# Patient Record
Sex: FEMALE | Race: WHITE | NOT HISPANIC OR LATINO | Employment: PART TIME | URBAN - METROPOLITAN AREA
[De-identification: names, ages, dates, MRNs, and addresses within clinical notes are randomized per-mention and may not be internally consistent; named-entity substitution may affect disease eponyms.]

---

## 2018-02-12 ENCOUNTER — HOSPITAL ENCOUNTER (EMERGENCY)
Facility: HOSPITAL | Age: 23
Discharge: HOME | End: 2018-02-12
Attending: EMERGENCY MEDICINE | Admitting: EMERGENCY MEDICINE
Payer: COMMERCIAL

## 2018-02-12 VITALS
HEART RATE: 91 BPM | OXYGEN SATURATION: 99 % | HEIGHT: 70 IN | SYSTOLIC BLOOD PRESSURE: 132 MMHG | TEMPERATURE: 98.1 F | RESPIRATION RATE: 18 BRPM | DIASTOLIC BLOOD PRESSURE: 64 MMHG | WEIGHT: 230 LBS | BODY MASS INDEX: 32.93 KG/M2

## 2018-02-12 DIAGNOSIS — L05.91 PILONIDAL CYST: Primary | ICD-10-CM

## 2018-02-12 PROCEDURE — 99282 EMERGENCY DEPT VISIT SF MDM: CPT

## 2018-02-12 RX ORDER — CLINDAMYCIN HYDROCHLORIDE 150 MG/1
300 CAPSULE ORAL ONCE
Status: COMPLETED | OUTPATIENT
Start: 2018-02-12 | End: 2018-02-12

## 2018-02-12 RX ORDER — OXYCODONE HYDROCHLORIDE AND ACETAMINOPHEN 5; 325 MG/1; MG/1
1 TABLET ORAL EVERY 4 HOURS PRN
Qty: 30 TABLET | Refills: 0 | Status: SHIPPED | OUTPATIENT
Start: 2018-02-12 | End: 2018-02-22

## 2018-02-12 RX ORDER — OXYCODONE HYDROCHLORIDE AND ACETAMINOPHEN 5; 325 MG/1; MG/1
2 TABLET ORAL ONCE
Status: COMPLETED | OUTPATIENT
Start: 2018-02-12 | End: 2018-02-12

## 2018-02-12 RX ORDER — CLINDAMYCIN HYDROCHLORIDE 150 MG/1
300 CAPSULE ORAL 4 TIMES DAILY
Qty: 56 CAPSULE | Refills: 0 | Status: SHIPPED | OUTPATIENT
Start: 2018-02-12 | End: 2018-02-19

## 2018-02-12 RX ADMIN — CLINDAMYCIN HYDROCHLORIDE 300 MG: 150 CAPSULE ORAL at 10:39

## 2018-02-12 RX ADMIN — OXYCODONE HYDROCHLORIDE AND ACETAMINOPHEN 2 TABLET: 5; 325 TABLET ORAL at 10:39

## 2018-02-12 NOTE — ED PROVIDER NOTES
History  Chief Complaint   Patient presents with    Abscess     Pt stated that she has an abscess on her tailbone  Started three weeks ago  unable to walk or sit comfortably  Pt has hx of abscess      Patient presents to the emergency department for evaluation of a painful location in her sacral area  She states that she has a history of pilonidal cysts and this is the 3rd episode which has been brewing for the past 3 weeks  She came today as her pain is increasing  She denies any trauma or specific injury  She denies fever chills  She denies rash or abdominal pain  None       History reviewed  No pertinent past medical history  Past Surgical History:   Procedure Laterality Date    WISDOM TOOTH EXTRACTION         History reviewed  No pertinent family history  I have reviewed and agree with the history as documented  Social History   Substance Use Topics    Smoking status: Current Every Day Smoker     Packs/day: 1 00     Types: Cigarettes    Smokeless tobacco: Never Used    Alcohol use No        Review of Systems   Constitutional: Negative  Negative for activity change, appetite change, chills, diaphoresis, fatigue and fever  HENT: Negative  Eyes: Negative  Respiratory: Negative  Cardiovascular: Negative  Gastrointestinal: Negative  Negative for abdominal pain, blood in stool, constipation, diarrhea, nausea, rectal pain and vomiting  Endocrine: Negative  Genitourinary: Negative  Negative for difficulty urinating, dysuria, flank pain, frequency, vaginal bleeding, vaginal discharge and vaginal pain  Musculoskeletal: Positive for back pain  Skin: Negative  Negative for rash and wound  Allergic/Immunologic: Negative  Neurological: Negative  Negative for dizziness and syncope  Hematological: Negative  Psychiatric/Behavioral: Negative          Physical Exam  ED Triage Vitals [02/12/18 0855]   Temperature Pulse Respirations Blood Pressure SpO2   98 1 °F (36 7 °C) 79 20 129/65 99 %      Temp Source Heart Rate Source Patient Position - Orthostatic VS BP Location FiO2 (%)   Oral Monitor Lying Right arm --      Pain Score       9           Orthostatic Vital Signs  Vitals:    02/12/18 0855   BP: 129/65   Pulse: 79   Patient Position - Orthostatic VS: Lying       Physical Exam   Constitutional: She is oriented to person, place, and time  Nontoxic appearance without respiratory distress  Patient appears mildly uncomfortable  Abdominal: Soft  Bowel sounds are normal  She exhibits no distension and no mass  There is no tenderness  There is no rebound and no guarding  No hernia  Musculoskeletal: She exhibits tenderness  She exhibits no edema or deformity  Tender area in the sacrum with mild induration and minimal fluctuance  No open lesions or drainage  No erythema or ecchymosis  Neurological: She is alert and oriented to person, place, and time  She displays normal reflexes  No cranial nerve deficit or sensory deficit  She exhibits normal muscle tone  Coordination normal    Skin: Skin is warm and dry  Psychiatric: She has a normal mood and affect  Her behavior is normal  Judgment and thought content normal    Nursing note and vitals reviewed  ED Medications  Medications   oxyCODONE-acetaminophen (PERCOCET) 5-325 mg per tablet 2 tablet (not administered)   clindamycin (CLEOCIN) capsule 300 mg (not administered)       Diagnostic Studies  Results Reviewed     None                 No orders to display              Procedures  Procedures       Phone Contacts  ED Phone Contact    ED Course  ED Course as of Feb 12 1031   Mon Feb 12, 2018   1028 Patient is stable for discharge  Her presentation does not call for incision and drainage at this point  I will prescribe pain medicine and antibiotics refer the patient to the surgeon is been managing this condition                                  MDM  CritCare Time    Disposition  Final diagnoses:   Pilonidal cyst     Time reflects when diagnosis was documented in both MDM as applicable and the Disposition within this note     Time User Action Codes Description Comment    2/12/2018 10:29 AM Jessicaalphonso Glen Add [L05 91] Pilonidal cyst       ED Disposition     ED Disposition Condition Comment    Discharge  Fredis Luisana discharge to home/self care  Condition at discharge: Stable        Follow-up Information     Follow up With Specialties Details Why [facility] surgeon  Schedule an appointment as soon as possible for a visit          Patient's Medications   Discharge Prescriptions    CLINDAMYCIN (CLEOCIN) 150 MG CAPSULE    Take 2 capsules (300 mg total) by mouth 4 (four) times a day for 7 days       Start Date: 2/12/2018 End Date: 2/19/2018       Order Dose: 300 mg       Quantity: 56 capsule    Refills: 0    OXYCODONE-ACETAMINOPHEN (PERCOCET) 5-325 MG PER TABLET    Take 1 tablet by mouth every 4 (four) hours as needed for moderate pain for up to 10 days Max Daily Amount: 6 tablets       Start Date: 2/12/2018 End Date: 2/22/2018       Order Dose: 1 tablet       Quantity: 30 tablet    Refills: 0     No discharge procedures on file      ED Provider  Electronically Signed by           Michelle Woodson MD  02/12/18 1843

## 2018-02-12 NOTE — DISCHARGE INSTRUCTIONS
Pilonidal Cyst   WHAT YOU NEED TO KNOW:   What is a pilonidal cyst?  A pilonidal cyst is a small sac under the skin  Pilonidal cysts may become infected and cause an abscess (collection of pus)  What causes a pilonidal cyst?  Pilonidal cysts may be caused by an ingrown hair  A hair may become ingrown if it rubs against your skin  The friction can cause hair to dig into the skin and get trapped there  What are the signs and symptoms of a pilonidal cyst?  A pilonidal cyst may look like a small hole or dimple in the center of your lower back  It is usually located right above your buttocks  The pilonidal cyst may feel tender or painful after doing physical activities or sitting for a long period of time  The cyst may feel hot, and be red and swollen if it becomes infected  Pus may also drain from the cyst    How is a pilonidal cyst diagnosed and treated? Your healthcare provider will examine you and ask about your symptoms  You may not need any treatment  A pilonidal cyst may go away on its own  If the cyst becomes infected, the pus may need to be drained  Your healthcare provider may make a small incision to drain the pus and pack it with gauze  Your cyst may need to be surgically removed if it becomes infected often  How can I help prevent an infection? · Shave around the cyst   This will prevent hairs from entering the cyst  Your healthcare provider may recommend laser hair removal      · Clean the cyst area as directed  Your healthcare provider may recommend that you use a mild soap and rinse it well  · Do not sit for long periods of time  This may cause the cyst to get irritated  When should I contact my healthcare provider? · You have a fever  · Your cyst is red and swollen  · Your cyst has pus draining from it  · You have questions or concerns about your condition or care  CARE AGREEMENT:   You have the right to help plan your care   Learn about your health condition and how it may be treated  Discuss treatment options with your caregivers to decide what care you want to receive  You always have the right to refuse treatment  The above information is an  only  It is not intended as medical advice for individual conditions or treatments  Talk to your doctor, nurse or pharmacist before following any medical regimen to see if it is safe and effective for you  © 2017 [address] Information is for End User's use only and may not be sold, redistributed or otherwise used for commercial purposes  All illustrations and images included in CareNotes® are the copyrighted property of [employer]  or [place]

## 2018-02-15 ENCOUNTER — HOSPITAL ENCOUNTER (EMERGENCY)
Facility: HOSPITAL | Age: 23
Discharge: HOME | End: 2018-02-15
Attending: EMERGENCY MEDICINE | Admitting: EMERGENCY MEDICINE
Payer: COMMERCIAL

## 2018-02-15 VITALS
WEIGHT: 230 LBS | BODY MASS INDEX: 33 KG/M2 | SYSTOLIC BLOOD PRESSURE: 121 MMHG | OXYGEN SATURATION: 98 % | HEART RATE: 80 BPM | RESPIRATION RATE: 16 BRPM | DIASTOLIC BLOOD PRESSURE: 59 MMHG | TEMPERATURE: 98.6 F

## 2018-02-15 DIAGNOSIS — L05.91 PILONIDAL CYST WITHOUT ABSCESS: Primary | ICD-10-CM

## 2018-02-15 PROCEDURE — 99282 EMERGENCY DEPT VISIT SF MDM: CPT

## 2018-02-15 RX ORDER — HYDROCODONE BITARTRATE AND ACETAMINOPHEN 5; 325 MG/1; MG/1
1 TABLET ORAL ONCE
Status: COMPLETED | OUTPATIENT
Start: 2018-02-15 | End: 2018-02-15

## 2018-02-15 RX ORDER — IBUPROFEN 600 MG/1
600 TABLET ORAL ONCE
Status: COMPLETED | OUTPATIENT
Start: 2018-02-15 | End: 2018-02-15

## 2018-02-15 RX ORDER — AMOXICILLIN AND CLAVULANATE POTASSIUM 875; 125 MG/1; MG/1
1 TABLET, FILM COATED ORAL EVERY 12 HOURS
Qty: 20 TABLET | Refills: 0 | Status: SHIPPED | OUTPATIENT
Start: 2018-02-15 | End: 2018-02-25

## 2018-02-15 RX ORDER — AMOXICILLIN AND CLAVULANATE POTASSIUM 875; 125 MG/1; MG/1
1 TABLET, FILM COATED ORAL ONCE
Status: COMPLETED | OUTPATIENT
Start: 2018-02-15 | End: 2018-02-15

## 2018-02-15 RX ORDER — ONDANSETRON 4 MG/1
4 TABLET, ORALLY DISINTEGRATING ORAL EVERY 6 HOURS PRN
Qty: 20 TABLET | Refills: 0 | Status: SHIPPED | OUTPATIENT
Start: 2018-02-15 | End: 2019-05-14

## 2018-02-15 RX ORDER — HYDROCODONE BITARTRATE AND ACETAMINOPHEN 5; 325 MG/1; MG/1
1 TABLET ORAL EVERY 6 HOURS PRN
Qty: 6 TABLET | Refills: 0 | Status: SHIPPED | OUTPATIENT
Start: 2018-02-15 | End: 2019-05-14

## 2018-02-15 RX ADMIN — IBUPROFEN 600 MG: 600 TABLET ORAL at 20:45

## 2018-02-15 RX ADMIN — AMOXICILLIN AND CLAVULANATE POTASSIUM 1 TABLET: 875; 125 TABLET, FILM COATED ORAL at 20:46

## 2018-02-15 RX ADMIN — HYDROCODONE BITARTRATE AND ACETAMINOPHEN 1 TABLET: 5; 325 TABLET ORAL at 20:45

## 2018-02-16 NOTE — ED NOTES
Discharge instructions and medications reviewed with pt  Pt verbalized understanding, with no further questions at this time  Pt ambulatory out of department, using steady gait, with no assistance       Lorena Leal RN  02/15/18 2050

## 2018-02-16 NOTE — DISCHARGE INSTRUCTIONS
Take the antibiotics as prescribed  Take anti-inflammatory medications, such as ibuprofen from the Motrin, Advil) or naproxen (Naprosyn, Aleve) as needed for pain and swelling, as per the instructions  Follow up with a surgeon for further evaluation of your recurrent cyst       Pilonidal Cyst   WHAT YOU SHOULD KNOW:   A pilonidal cyst is a small sac under the skin that looks like a small hole or dimple  It usually grows in the skin on your lower back, near the bottom of the spine  AFTER YOU LEAVE:   Follow up with your healthcare provider as directed:  Write down your questions so you remember to ask them during your visits  Contact your primary healthcare provider if:   · You have a fever  · Your cyst is red and swollen  · Your cyst has white or yellow fluid coming out of it  · You have questions or concerns about your condition or care  © 2014 [address] is for End User's use only and may not be sold, redistributed or otherwise used for commercial purposes  All illustrations and images included in CareNotes® are the copyrighted property of [employer]  or [address]  The above information is an  only  It is not intended as medical advice for individual conditions or treatments  Talk to your doctor, nurse or pharmacist before following any medical regimen to see if it is safe and effective for you

## 2018-02-16 NOTE — ED NOTES
Pt crying on stretcher at this time  Pt requesting pain medications  Pt's mother reports pt "ran out of medications given to her last visit " Pt educated upon awaiting provider's evaluation before medications can be prescribed  Pt offered Tylenol or Motrin, but refused       José Miguel Mitchell RN  02/15/18 1931

## 2018-02-16 NOTE — ED PROVIDER NOTES
History  Chief Complaint   Patient presents with    Cyst     Pt has a cyst on her tailbone and was seen on Monday, given abx and pt states it has doubled in size and the pain has not gotten better     HPI    Prior to Admission Medications   Prescriptions Last Dose Informant Patient Reported? Taking? clindamycin (CLEOCIN) 150 mg capsule   No No   Sig: Take 2 capsules (300 mg total) by mouth 4 (four) times a day for 7 days   oxyCODONE-acetaminophen (PERCOCET) 5-325 mg per tablet   No No   Sig: Take 1 tablet by mouth every 4 (four) hours as needed for moderate pain for up to 10 days Max Daily Amount: 6 tablets      Facility-Administered Medications: None       History reviewed  No pertinent past medical history  Past Surgical History:   Procedure Laterality Date    WISDOM TOOTH EXTRACTION         History reviewed  No pertinent family history  I have reviewed and agree with the history as documented  Social History   Substance Use Topics    Smoking status: Current Every Day Smoker     Packs/day: 1 00     Types: Cigarettes    Smokeless tobacco: Never Used    Alcohol use No        Review of Systems    Physical Exam  ED Triage Vitals [02/15/18 1720]   Temperature Pulse Respirations Blood Pressure SpO2   99 3 °F (37 4 °C) (!) 110 20 143/66 99 %      Temp Source Heart Rate Source Patient Position - Orthostatic VS BP Location FiO2 (%)   Oral Monitor Standing Left arm --      Pain Score       Worst Possible Pain           Orthostatic Vital Signs  Vitals:    02/15/18 1720 02/15/18 2047   BP: 143/66 121/59   Pulse: (!) 110 80   Patient Position - Orthostatic VS: Standing Lying       Physical Exam   Constitutional: She is oriented to person, place, and time  She appears well-developed and well-nourished  No distress  HENT:   Head: Normocephalic and atraumatic  Eyes: Conjunctivae are normal  Pupils are equal, round, and reactive to light  Neck: Normal range of motion  No tracheal deviation present  Cardiovascular: Normal rate, regular rhythm and intact distal pulses  Pulses:       Radial pulses are 2+ on the left side  Pulmonary/Chest: Effort normal  No respiratory distress  Abdominal: Soft  She exhibits no distension  There is no tenderness  Neurological: She is alert and oriented to person, place, and time  GCS eye subscore is 4  GCS verbal subscore is 5  GCS motor subscore is 6  Skin: Skin is warm and dry  Psychiatric: She has a normal mood and affect  Her behavior is normal    Nursing note and vitals reviewed  ED Medications  Medications   amoxicillin-clavulanate (AUGMENTIN) 875-125 mg per tablet 1 tablet (1 tablet Oral Given 2/15/18 2046)   ibuprofen (MOTRIN) tablet 600 mg (600 mg Oral Given 2/15/18 2045)   HYDROcodone-acetaminophen (NORCO) 5-325 mg per tablet 1 tablet (1 tablet Oral Given 2/15/18 2045)       Diagnostic Studies  Results Reviewed     None                 RADIOLOGY RESULTS   Final Result by  (02/17 1423)                 Procedures  General Procedure  Date/Time: 2/15/2018 8:20 AM  Performed by: Darryle Sieve  Authorized by: Darryle Sieve     Patient location:  ED and bedside  Assisting Provider(s): No    Consent:     Consent obtained:  Verbal    Consent given by:  Patient  Indications:     Indications:  Evaluate for abscess  Procedure Detail:     Equipment used:  Linear ultrasound probe    Procedure note (site, laterality, method, findings):  <0 5 cm area fluid collection, mild surrounding interstitial edema           Phone Contacts  ED Phone Contact    ED Course  ED Course                                MDM  Number of Diagnoses or Management Options  Pilonidal cyst without abscess: new and requires workup  Diagnosis management comments:  This is a 28-year-old female who presents here today with worsening pilonidal cyst   She was seen here on 02/12 for the same, was told there was not enough fluid to drain, and was started on antibiotics  She returned today because of spread of redness, increase in size and worsening of pain  She endorses some nausea and vomiting secondary to the pain medication, and is not sure of if she has there are not any of the antibiotics, but states the antibiotic is upsetting her stomach as well  She has seen a surgeon for this previously, but was told that as it was a one time infection did not get need surgery for it  She has not followed up with her surgeon since she was seen here three days ago like she was advised, nor called to make an appointment  She denies any abdominal pain, pain with bowel movements, fevers,  or other complaints  ROS: Otherwise negative, unless stated as above  She is well-appearing, in no acute distress  She has a small area of induration at the top of the gluteal cleft, with mild surrounding erythema, and significant tenderness  Bedside ultrasound shows a subcentimeter pocket of fluid, that is far too small to be drained  There is minimal interstitial edema  I discussed with the patient findings, the fact that there is still no fluid for drainage, need for follow-up with surgery for further evaluation and surgical management  As she is endorsing side-effects from the antibiotic and feeling like it is not worsening, we will switch her to Augmentin for treatment  I discussed with her continued symptomatic treatment at home, importance of follow-up, and indications for return, and she expresses understanding with this plan         Amount and/or Complexity of Data Reviewed  Independent visualization of images, tracings, or specimens: yes      CritCare Time    Disposition  Final diagnoses:   Pilonidal cyst without abscess     Time reflects when diagnosis was documented in both MDM as applicable and the Disposition within this note     Time User Action Codes Description Comment    2/15/2018  8:32 PM Burt Scott Add [L05 91] Pilonidal cyst without abscess ED Disposition     ED Disposition Condition Comment    Discharge  Alvin Jaramillo discharge to home/self care  Condition at discharge: Good        Follow-up Information     Follow up With Specialties Details Why Contact Info    Maryann Vigil MD General Surgery Schedule an appointment as soon as possible for a visit in 2 days to follow up on your symptoms [address]  Bertin Masonma [address], DO Family Medicine Schedule an appointment as soon as possible for a visit As needed [address]  [address]  [address] [place] [address]  [phone]          Discharge Medication List as of 2/15/2018  8:36 PM      START taking these medications    Details   amoxicillin-clavulanate (AUGMENTIN) 875-125 mg per tablet Take 1 tablet by mouth every 12 (twelve) hours for 10 days, Starting Thu 2/15/2018, Until Sun 2/25/2018, Print      HYDROcodone-acetaminophen (NORCO) 5-325 mg per tablet Take 1 tablet by mouth every 6 (six) hours as needed for pain (severe pain) Max Daily Amount: 4 tablets, Starting Thu 2/15/2018, Print      ondansetron (ZOFRAN-ODT) 4 mg disintegrating tablet Take 1 tablet (4 mg total) by mouth every 6 (six) hours as needed for nausea or vomiting, Starting Thu 2/15/2018, Print         CONTINUE these medications which have NOT CHANGED    Details   clindamycin (CLEOCIN) 150 mg capsule Take 2 capsules (300 mg total) by mouth 4 (four) times a day for 7 days, Starting Mon 2/12/2018, Until Mon 2/19/2018, Print      oxyCODONE-acetaminophen (PERCOCET) 5-325 mg per tablet Take 1 tablet by mouth every 4 (four) hours as needed for moderate pain for up to 10 days Max Daily Amount: 6 tablets, Starting Mon 2/12/2018, Until Thu 2/22/2018, Print           No discharge procedures on file      ED Provider  Electronically Signed by           Reno Dewitt MD  02/18/18 1022

## 2019-05-14 ENCOUNTER — OFFICE VISIT (OUTPATIENT)
Dept: URGENT CARE | Facility: CLINIC | Age: 24
End: 2019-05-14
Payer: COMMERCIAL

## 2019-05-14 VITALS
RESPIRATION RATE: 16 BRPM | HEART RATE: 93 BPM | DIASTOLIC BLOOD PRESSURE: 64 MMHG | SYSTOLIC BLOOD PRESSURE: 100 MMHG | HEIGHT: 67 IN | WEIGHT: 202 LBS | OXYGEN SATURATION: 97 % | BODY MASS INDEX: 31.71 KG/M2 | TEMPERATURE: 97.3 F

## 2019-05-14 DIAGNOSIS — T63.621A JELLYFISH STING, ACCIDENTAL OR UNINTENTIONAL, INITIAL ENCOUNTER: Primary | ICD-10-CM

## 2019-05-14 PROCEDURE — G0382 LEV 3 HOSP TYPE B ED VISIT: HCPCS | Performed by: PHYSICIAN ASSISTANT

## 2019-05-14 PROCEDURE — S9083 URGENT CARE CENTER GLOBAL: HCPCS | Performed by: PHYSICIAN ASSISTANT

## 2019-05-14 RX ORDER — PREDNISONE 10 MG/1
TABLET ORAL
Qty: 30 TABLET | Refills: 0 | Status: SHIPPED | OUTPATIENT
Start: 2019-05-14 | End: 2019-07-31

## 2019-07-31 ENCOUNTER — OFFICE VISIT (OUTPATIENT)
Dept: URGENT CARE | Facility: CLINIC | Age: 24
End: 2019-07-31
Payer: COMMERCIAL

## 2019-07-31 VITALS
HEIGHT: 70 IN | BODY MASS INDEX: 31.21 KG/M2 | DIASTOLIC BLOOD PRESSURE: 64 MMHG | SYSTOLIC BLOOD PRESSURE: 100 MMHG | TEMPERATURE: 97.6 F | OXYGEN SATURATION: 98 % | RESPIRATION RATE: 16 BRPM | HEART RATE: 88 BPM | WEIGHT: 218 LBS

## 2019-07-31 DIAGNOSIS — L05.91 PILONIDAL CYST: Primary | ICD-10-CM

## 2019-07-31 PROCEDURE — 99213 OFFICE O/P EST LOW 20 MIN: CPT | Performed by: PHYSICIAN ASSISTANT

## 2019-07-31 RX ORDER — CLINDAMYCIN HYDROCHLORIDE 300 MG/1
300 CAPSULE ORAL 4 TIMES DAILY
Qty: 28 CAPSULE | Refills: 0 | Status: SHIPPED | OUTPATIENT
Start: 2019-07-31 | End: 2019-08-07

## 2019-07-31 NOTE — PROGRESS NOTES
[facility]        NAME: Marta Segura is a 26 y o  female  : 1995    MRN: [id number]  DATE: 2019  TIME: 8:50 AM    Assessment and Plan   Pilonidal cyst [L05 91]  1  Pilonidal cyst  clindamycin (CLEOCIN) 300 MG capsule     F/u general surgery    Patient Instructions       Follow up with PCP in 3-5 days  Proceed to  ER if symptoms worsen  Chief Complaint     Chief Complaint   Patient presents with    cyst on tail bone     x 4 years, red/irritated x 2-3 days         History of Present Illness       21-year-old female presents for evaluation of an abscess  Patient states that she has had this in the past   The abscess cyst in the tailbone region  She states she was diagnosed with a pilonidal cyst   She states she has seen General surgery for this and is exploring her options regarding surgery  Patient is afebrile  Review of Systems   Review of Systems   Constitutional: Negative for chills, fatigue and fever  HENT: Negative for congestion, ear pain, sinus pain, sore throat and trouble swallowing  Eyes: Negative for pain, discharge and redness  Respiratory: Negative for cough, chest tightness, shortness of breath and wheezing  Cardiovascular: Negative for chest pain, palpitations and leg swelling  Gastrointestinal: Negative for abdominal pain, diarrhea, nausea and vomiting  Musculoskeletal: Negative for arthralgias, joint swelling and myalgias  Skin: Positive for wound  Negative for rash  Neurological: Negative for dizziness, weakness, numbness and headaches           Current Medications       Current Outpatient Medications:     etonogestrel (NEXPLANON) subdermal implant, 1 ea, Disp: , Rfl:     clindamycin (CLEOCIN) 300 MG capsule, Take 1 capsule (300 mg total) by mouth 4 (four) times a day for 7 days, Disp: 28 capsule, Rfl: 0    Current Allergies     Allergies as of 2019 - Reviewed 2019   Allergen Reaction Noted    Iodine Anaphylaxis 2018    Shellfish-derived products Swelling 01/03/2014    Strawberry extract Rash 12/28/2013            The following portions of the patient's history were reviewed and updated as appropriate: allergies, current medications, past family history, past medical history, past social history, past surgical history and problem list      History reviewed  No pertinent past medical history  Past Surgical History:   Procedure Laterality Date    WISDOM TOOTH EXTRACTION         Family History   Problem Relation Age of Onset    No Known Problems Mother     No Known Problems Father          Medications have been verified  Objective   /64 (BP Location: Left arm, Patient Position: Sitting, Cuff Size: Standard)   Pulse 88   Temp 97 6 °F (36 4 °C) (Tympanic)   Resp 16   Ht 5' 10" (1 778 m)   Wt 98 9 kg (218 lb)   SpO2 98%   BMI 31 28 kg/m²        Physical Exam     Physical Exam   Constitutional: Vital signs are normal  She appears well-developed  No distress  Cardiovascular: Normal rate, regular rhythm, normal heart sounds and intact distal pulses     Pulmonary/Chest: Effort normal and breath sounds normal    Skin:

## 2019-07-31 NOTE — LETTER
July 31, 2019     Patient: Alejo Siddiqui   YOB: 1995   Date of Visit: 7/31/2019       To Whom it May Concern:    Alejo Siddiqui was seen in my clinic on 7/31/2019  She may return to work on 08/01/2019  If you have any questions or concerns, please don't hesitate to call           Sincerely,          Zee Ryan PA-C        CC: No Recipients

## 2019-11-21 ENCOUNTER — OFFICE VISIT (OUTPATIENT)
Dept: URGENT CARE | Facility: CLINIC | Age: 24
End: 2019-11-21
Payer: COMMERCIAL

## 2019-11-21 VITALS
BODY MASS INDEX: 31.21 KG/M2 | TEMPERATURE: 98.5 F | HEART RATE: 87 BPM | WEIGHT: 218 LBS | SYSTOLIC BLOOD PRESSURE: 110 MMHG | DIASTOLIC BLOOD PRESSURE: 70 MMHG | OXYGEN SATURATION: 96 % | RESPIRATION RATE: 18 BRPM | HEIGHT: 70 IN

## 2019-11-21 DIAGNOSIS — J20.9 ACUTE BRONCHITIS, UNSPECIFIED ORGANISM: Primary | ICD-10-CM

## 2019-11-21 PROCEDURE — 99214 OFFICE O/P EST MOD 30 MIN: CPT | Performed by: PHYSICIAN ASSISTANT

## 2019-11-21 RX ORDER — ALBUTEROL SULFATE 90 UG/1
1-2 AEROSOL, METERED RESPIRATORY (INHALATION) EVERY 6 HOURS PRN
Qty: 1 INHALER | Refills: 0 | Status: SHIPPED | OUTPATIENT
Start: 2019-11-21 | End: 2019-11-28

## 2019-11-21 RX ORDER — PREDNISONE 20 MG/1
20 TABLET ORAL 2 TIMES DAILY WITH MEALS
Qty: 10 TABLET | Refills: 0 | Status: SHIPPED | OUTPATIENT
Start: 2019-11-21 | End: 2019-11-26

## 2019-11-21 NOTE — PROGRESS NOTES
[facility]        NAME: Ferdinand Parker is a 27 y o  female  : 1995    MRN: [id number]  DATE: 2019  TIME: 9:58 AM    Assessment and Plan   Acute bronchitis, unspecified organism [J20 9]  1  Acute bronchitis, unspecified organism  predniSONE 20 mg tablet    albuterol (PROVENTIL HFA,VENTOLIN HFA) 90 mcg/act inhaler         Patient Instructions     Patient Instructions   1  Bronchitis  -Take prednisone as directed with food  -Use inhaler or nebulizer every 4-6 hours as needed  -Tylenol/Motrin  -Increase fluids  -Follow-up with PCP within 3-5 days    Go to ER with worsening symptoms, fever, chest pain, shortness of breath, or any signs of distress     Follow up with PCP in 3-5 days  Proceed to  ER if symptoms worsen  Chief Complaint     Chief Complaint   Patient presents with    Cough     Pt c/o productive cough, chest congestion, head congestion and body aches x 3 days  History of Present Illness       Patient is a 44-year-old female who presents today for evaluation of a cough for the past 3 days  Patient states that her chest feels sore from coughing and states that her cough is productive  She also admits having some nasal congestion and body aches  No fevers  Review of Systems   Review of Systems   Constitutional: Negative for chills and fever  HENT: Positive for congestion  Negative for ear pain, rhinorrhea and sore throat  Respiratory: Positive for cough  Negative for shortness of breath  Cardiovascular: Negative for chest pain  Musculoskeletal: Positive for arthralgias  Neurological: Negative for headaches  All other systems reviewed and are negative          Current Medications       Current Outpatient Medications:     albuterol (PROVENTIL HFA,VENTOLIN HFA) 90 mcg/act inhaler, Inhale 1-2 puffs every 6 (six) hours as needed for wheezing for up to 7 days, Disp: 1 Inhaler, Rfl: 0    etonogestrel (NEXPLANON) subdermal implant, 1 ea, Disp: , Rfl:   predniSONE 20 mg tablet, Take 1 tablet (20 mg total) by mouth 2 (two) times a day with meals for 5 days, Disp: 10 tablet, Rfl: 0    Current Allergies     Allergies as of 11/21/2019 - Reviewed 11/21/2019   Allergen Reaction Noted    Iodine Anaphylaxis 02/12/2018    Shellfish-derived products Swelling 01/03/2014    Strawberry extract Rash 12/28/2013            The following portions of the patient's history were reviewed and updated as appropriate: allergies, current medications, past family history, past medical history, past social history, past surgical history and problem list      History reviewed  No pertinent past medical history  Past Surgical History:   Procedure Laterality Date    WISDOM TOOTH EXTRACTION         Family History   Problem Relation Age of Onset    No Known Problems Mother     No Known Problems Father          Medications have been verified  Objective   /70   Pulse 87   Temp 98 5 °F (36 9 °C)   Resp 18   Ht 5' 10" (1 778 m)   Wt 98 9 kg (218 lb)   SpO2 96%   BMI 31 28 kg/m²        Physical Exam     Physical Exam   Constitutional: She is oriented to person, place, and time  She appears well-developed and well-nourished  No distress  HENT:   Head: Normocephalic and atraumatic  Right Ear: Tympanic membrane, external ear and ear canal normal    Left Ear: Tympanic membrane, external ear and ear canal normal    Nose: Nose normal    Mouth/Throat: Uvula is midline, oropharynx is clear and moist and mucous membranes are normal    Eyes: Pupils are equal, round, and reactive to light  Conjunctivae and EOM are normal    Neck: Normal range of motion  Neck supple  Cardiovascular: Normal rate, regular rhythm and normal heart sounds  Pulmonary/Chest: Effort normal  No respiratory distress  She has wheezes (end expiratory wheezing) in the right middle field, the right lower field, the left middle field and the left lower field     Lymphadenopathy:     She has no cervical adenopathy  Neurological: She is alert and oriented to person, place, and time  Skin: Skin is warm and dry  Psychiatric: She has a normal mood and affect  Nursing note and vitals reviewed

## 2019-11-21 NOTE — PATIENT INSTRUCTIONS
1   Bronchitis  -Take prednisone as directed with food  -Use inhaler or nebulizer every 4-6 hours as needed  -Tylenol/Motrin  -Increase fluids  -Follow-up with PCP within 3-5 days    Go to ER with worsening symptoms, fever, chest pain, shortness of breath, or any signs of distress

## 2021-01-22 ENCOUNTER — OFFICE VISIT (OUTPATIENT)
Dept: URGENT CARE | Facility: CLINIC | Age: 26
End: 2021-01-22
Payer: COMMERCIAL

## 2021-01-22 ENCOUNTER — TELEPHONE (OUTPATIENT)
Dept: URGENT CARE | Facility: CLINIC | Age: 26
End: 2021-01-22

## 2021-01-22 VITALS
TEMPERATURE: 97.7 F | HEIGHT: 70 IN | SYSTOLIC BLOOD PRESSURE: 98 MMHG | WEIGHT: 200 LBS | HEART RATE: 83 BPM | BODY MASS INDEX: 28.63 KG/M2 | OXYGEN SATURATION: 96 % | DIASTOLIC BLOOD PRESSURE: 81 MMHG | RESPIRATION RATE: 18 BRPM

## 2021-01-22 DIAGNOSIS — K04.7 DENTAL ABSCESS: Primary | ICD-10-CM

## 2021-01-22 PROCEDURE — 99213 OFFICE O/P EST LOW 20 MIN: CPT | Performed by: PHYSICIAN ASSISTANT

## 2021-01-22 RX ORDER — AMOXICILLIN AND CLAVULANATE POTASSIUM 875; 125 MG/1; MG/1
1 TABLET, FILM COATED ORAL EVERY 12 HOURS SCHEDULED
Qty: 28 TABLET | Refills: 0 | Status: SHIPPED | OUTPATIENT
Start: 2021-01-22 | End: 2021-02-05

## 2021-01-22 RX ORDER — AMOXICILLIN AND CLAVULANATE POTASSIUM 875; 125 MG/1; MG/1
1 TABLET, FILM COATED ORAL EVERY 12 HOURS SCHEDULED
Qty: 28 TABLET | Refills: 0 | Status: SHIPPED | OUTPATIENT
Start: 2021-01-22 | End: 2021-01-22

## 2021-01-22 NOTE — PATIENT INSTRUCTIONS
Start augmentin twice daily, use for only 1 week if symptoms resolved  Warm salt water rinses  Flossing  Follow up with dentist  Go to ER with worsening symptoms  Dental Abscess   WHAT YOU NEED TO KNOW:   A dental abscess is a collection of pus in or around a tooth  A dental abscess is caused by bacteria  The bacteria usually enter the tooth when the enamel (outer part of the tooth) is damaged by tooth decay  Bacteria may also enter the tooth through a break or chip in the tooth, or a cut in the gum  Food particles that are stuck between the teeth for a long time may also lead to an abscess  DISCHARGE INSTRUCTIONS:   Return to the emergency department if:   · You have severe pain  · You have trouble breathing because of pain or swelling  Contact your healthcare provider if:   · Your symptoms get worse, even after treatment  · Your mouth is bleeding  · You cannot eat or drink because of pain or swelling  · Your abscess returns  · You have an injury that causes a crack in your tooth  · You have questions or concerns about your condition or care  Medicines: You may  need any of the following:  · Antibiotics  help treat a bacterial infection  · NSAIDs , such as ibuprofen, help decrease swelling, pain, and fever  This medicine is available with or without a doctor's order  NSAIDs can cause stomach bleeding or kidney problems in certain people  If you take blood thinner medicine, always ask your healthcare provider if NSAIDs are safe for you  Always read the medicine label and follow directions  · Acetaminophen  decreases pain and fever  It is available without a doctor's order  Ask how much to take and how often to take it  Follow directions  Read the labels of all other medicines you are using to see if they also contain acetaminophen, or ask your doctor or pharmacist  Acetaminophen can cause liver damage if not taken correctly   Do not use more than 4 grams (4,000 milligrams) total of acetaminophen in one day  · Prescription pain medicine  may be given  Ask your healthcare provider how to take this medicine safely  Some prescription pain medicines contain acetaminophen  Do not take other medicines that contain acetaminophen without talking to your healthcare provider  Too much acetaminophen may cause liver damage  Prescription pain medicine may cause constipation  Ask your healthcare provider how to prevent or treat constipation  · Take your medicine as directed  Contact your healthcare provider if you think your medicine is not helping or if you have side effects  Tell him of her if you are allergic to any medicine  Keep a list of the medicines, vitamins, and herbs you take  Include the amounts, and when and why you take them  Bring the list or the pill bottles to follow-up visits  Carry your medicine list with you in case of an emergency  Self-care:   · Rinse your mouth every 2 hours with salt water  This will help keep the area clean  · Gently brush your teeth twice a day with a soft tooth brush  This will help keep the area clean  · Eat soft foods as directed  Soft foods may cause less pain  Examples include applesauce, yogurt, and cooked pasta  Ask your healthcare provider how long to follow this instruction  · Apply a warm compress to your tooth or gum  Use a cotton ball or gauze soaked in warm water  Remove the compress in 10 minutes or when it becomes cool  Repeat 3 times a day  Prevent another abscess:   · Brush your teeth at least 2 times a day with fluoride toothpaste  · Use dental floss to clean between your teeth at least once a day  · Rinse your mouth with water or mouthwash after meals and snacks  · Chew sugarless gum after meals and snacks  · Limit foods that are sticky and high in sugar such as raisons  Also limit drinks high in sugar, such as soda       · See your dentist every 6 months for dental cleanings and oral exams  Follow up with your healthcare provider in 24 hours: Your healthcare provider will need to check your teeth and gums  Write down your questions so you remember to ask them during your visits  © Copyright [address] Information is for End User's use only and may not be sold, redistributed or otherwise used for commercial purposes  All illustrations and images included in CareNotes® are the copyrighted property of [employer]  or [facility] [employer]   The above information is an  only  It is not intended as medical advice for individual conditions or treatments  Talk to your doctor, nurse or pharmacist before following any medical regimen to see if it is safe and effective for you
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
## 2021-01-23 NOTE — TELEPHONE ENCOUNTER
Pt called earlier asking to have prescription from today's visit transferred to [facility] in [facility]  Called to let her know provider had transferred it, however her voicemail wasn't set up

## 2021-05-04 ENCOUNTER — OFFICE VISIT (OUTPATIENT)
Dept: URGENT CARE | Facility: CLINIC | Age: 26
End: 2021-05-04
Payer: COMMERCIAL

## 2021-05-04 VITALS
HEART RATE: 86 BPM | RESPIRATION RATE: 18 BRPM | SYSTOLIC BLOOD PRESSURE: 118 MMHG | DIASTOLIC BLOOD PRESSURE: 71 MMHG | OXYGEN SATURATION: 98 %

## 2021-05-04 DIAGNOSIS — K04.7 DENTAL ABSCESS: Primary | ICD-10-CM

## 2021-05-04 PROCEDURE — 99213 OFFICE O/P EST LOW 20 MIN: CPT | Performed by: PHYSICIAN ASSISTANT

## 2021-05-04 RX ORDER — AMOXICILLIN AND CLAVULANATE POTASSIUM 875; 125 MG/1; MG/1
1 TABLET, FILM COATED ORAL EVERY 12 HOURS SCHEDULED
Qty: 20 TABLET | Refills: 0 | Status: SHIPPED | OUTPATIENT
Start: 2021-05-04 | End: 2021-05-14

## 2021-05-04 NOTE — PATIENT INSTRUCTIONS
Start Augmentin  Mouth rinses after each meal   Tylenol and Motrin for pain  Dentist follow-up  PCP follow-up  Go to emergency room with difficulty breathing or swallowing  Dental Abscess   WHAT YOU NEED TO KNOW:   A dental abscess is a collection of pus in or around a tooth  A dental abscess is caused by bacteria  The bacteria usually enter the tooth when the enamel (outer part of the tooth) is damaged by tooth decay  Bacteria may also enter the tooth through a break or chip in the tooth, or a cut in the gum  Food particles that are stuck between the teeth for a long time may also lead to an abscess  DISCHARGE INSTRUCTIONS:   Return to the emergency department if:   · You have severe pain  · You have trouble breathing because of pain or swelling  Contact your healthcare provider if:   · Your symptoms get worse, even after treatment  · Your mouth is bleeding  · You cannot eat or drink because of pain or swelling  · Your abscess returns  · You have an injury that causes a crack in your tooth  · You have questions or concerns about your condition or care  Medicines: You may  need any of the following:  · Antibiotics  help treat a bacterial infection  · NSAIDs , such as ibuprofen, help decrease swelling, pain, and fever  This medicine is available with or without a doctor's order  NSAIDs can cause stomach bleeding or kidney problems in certain people  If you take blood thinner medicine, always ask your healthcare provider if NSAIDs are safe for you  Always read the medicine label and follow directions  · Acetaminophen  decreases pain and fever  It is available without a doctor's order  Ask how much to take and how often to take it  Follow directions  Read the labels of all other medicines you are using to see if they also contain acetaminophen, or ask your doctor or pharmacist  Acetaminophen can cause liver damage if not taken correctly   Do not use more than 4 grams (4,000 milligrams) total of acetaminophen in one day  · Prescription pain medicine  may be given  Ask your healthcare provider how to take this medicine safely  Some prescription pain medicines contain acetaminophen  Do not take other medicines that contain acetaminophen without talking to your healthcare provider  Too much acetaminophen may cause liver damage  Prescription pain medicine may cause constipation  Ask your healthcare provider how to prevent or treat constipation  · Take your medicine as directed  Contact your healthcare provider if you think your medicine is not helping or if you have side effects  Tell him of her if you are allergic to any medicine  Keep a list of the medicines, vitamins, and herbs you take  Include the amounts, and when and why you take them  Bring the list or the pill bottles to follow-up visits  Carry your medicine list with you in case of an emergency  Self-care:   · Rinse your mouth every 2 hours with salt water  This will help keep the area clean  · Gently brush your teeth twice a day with a soft tooth brush  This will help keep the area clean  · Eat soft foods as directed  Soft foods may cause less pain  Examples include applesauce, yogurt, and cooked pasta  Ask your healthcare provider how long to follow this instruction  · Apply a warm compress to your tooth or gum  Use a cotton ball or gauze soaked in warm water  Remove the compress in 10 minutes or when it becomes cool  Repeat 3 times a day  Prevent another abscess:   · Brush your teeth at least 2 times a day with fluoride toothpaste  · Use dental floss to clean between your teeth at least once a day  · Rinse your mouth with water or mouthwash after meals and snacks  · Chew sugarless gum after meals and snacks  · Limit foods that are sticky and high in sugar such as raisons  Also limit drinks high in sugar, such as soda       · See your dentist every 6 months for dental cleanings and oral exams  Follow up with your healthcare provider in 24 hours: Your healthcare provider will need to check your teeth and gums  Write down your questions so you remember to ask them during your visits  © Copyright [address] Information is for End User's use only and may not be sold, redistributed or otherwise used for commercial purposes  All illustrations and images included in CareNotes® are the copyrighted property of [employer]  or [facility] [employer]   The above information is an  only  It is not intended as medical advice for individual conditions or treatments  Talk to your doctor, nurse or pharmacist before following any medical regimen to see if it is safe and effective for you
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
## 2021-07-07 ENCOUNTER — OFFICE VISIT (OUTPATIENT)
Dept: URGENT CARE | Facility: CLINIC | Age: 26
End: 2021-07-07
Payer: COMMERCIAL

## 2021-07-07 VITALS
RESPIRATION RATE: 16 BRPM | HEART RATE: 91 BPM | WEIGHT: 183 LBS | DIASTOLIC BLOOD PRESSURE: 72 MMHG | TEMPERATURE: 97.3 F | SYSTOLIC BLOOD PRESSURE: 104 MMHG | BODY MASS INDEX: 26.26 KG/M2 | OXYGEN SATURATION: 96 %

## 2021-07-07 DIAGNOSIS — L05.91 PILONIDAL CYST: Primary | ICD-10-CM

## 2021-07-07 PROCEDURE — G0382 LEV 3 HOSP TYPE B ED VISIT: HCPCS | Performed by: PHYSICIAN ASSISTANT

## 2021-07-07 PROCEDURE — S9083 URGENT CARE CENTER GLOBAL: HCPCS | Performed by: PHYSICIAN ASSISTANT

## 2021-07-07 RX ORDER — AMOXICILLIN AND CLAVULANATE POTASSIUM 875; 125 MG/1; MG/1
1 TABLET, FILM COATED ORAL EVERY 12 HOURS SCHEDULED
Qty: 14 TABLET | Refills: 0 | Status: SHIPPED | OUTPATIENT
Start: 2021-07-07 | End: 2021-07-14
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
## 2023-09-26 ENCOUNTER — OFFICE VISIT (OUTPATIENT)
Dept: URGENT CARE | Facility: CLINIC | Age: 28
End: 2023-09-26

## 2023-09-26 VITALS
OXYGEN SATURATION: 98 % | WEIGHT: 197.25 LBS | HEART RATE: 68 BPM | DIASTOLIC BLOOD PRESSURE: 64 MMHG | TEMPERATURE: 97.9 F | BODY MASS INDEX: 28.3 KG/M2 | SYSTOLIC BLOOD PRESSURE: 118 MMHG | RESPIRATION RATE: 16 BRPM

## 2023-09-26 DIAGNOSIS — Z78.9 DATE OF LAST MENSTRUAL PERIOD (LMP) UNKNOWN: ICD-10-CM

## 2023-09-26 DIAGNOSIS — K08.89 PAIN, DENTAL: Primary | ICD-10-CM

## 2023-09-26 LAB — SL AMB POCT URINE HCG: NEGATIVE

## 2023-09-26 PROCEDURE — 99213 OFFICE O/P EST LOW 20 MIN: CPT

## 2023-09-26 PROCEDURE — 96372 THER/PROPH/DIAG INJ SC/IM: CPT

## 2023-09-26 PROCEDURE — 81025 URINE PREGNANCY TEST: CPT

## 2023-09-26 RX ORDER — PENICILLIN V POTASSIUM 500 MG/1
500 TABLET ORAL EVERY 6 HOURS SCHEDULED
Qty: 56 TABLET | Refills: 0 | Status: SHIPPED | OUTPATIENT
Start: 2023-09-26 | End: 2023-10-10

## 2023-09-26 RX ORDER — IBUPROFEN 800 MG/1
800 TABLET ORAL EVERY 6 HOURS PRN
Qty: 30 TABLET | Refills: 0 | Status: SHIPPED | OUTPATIENT
Start: 2023-09-26

## 2023-09-26 RX ORDER — KETOROLAC TROMETHAMINE 30 MG/ML
30 INJECTION, SOLUTION INTRAMUSCULAR; INTRAVENOUS ONCE
Status: COMPLETED | OUTPATIENT
Start: 2023-09-26 | End: 2023-09-26

## 2023-09-26 RX ADMIN — KETOROLAC TROMETHAMINE 30 MG: 30 INJECTION, SOLUTION INTRAMUSCULAR; INTRAVENOUS at 08:50

## 2023-09-26 NOTE — PATIENT INSTRUCTIONS
Take antibiotic as directed. Take entire course of antibiotics. Eat yogurt with live and active cultures and/or take a probiotic at least 3 hours before or after antibiotic dose. Monitor stool for diarrhea and/or blood. If this occurs, contact primary care doctor ASAP. Recommend saltwater rinses every 2 hours, soft toothbrush, and soft food. Tylenol 1000 mg q8h as needed for pain  Ibuprofen 800 mg every 6 hours as needed for pain     If you develop any increased pain, facial swelling, or unable to open or close your mouth, prolonged high fever, any new or concerning symptoms please return proceed ER. Advised follow-up with dentist as soon as possible. Follow up with PCP in 3-5 days. Proceed to ER if symptoms worsen.
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
## 2024-09-11 ENCOUNTER — APPOINTMENT (OUTPATIENT)
Dept: URGENT CARE | Facility: MEDICAL CENTER | Age: 29
End: 2024-09-11

## 2024-09-12 ENCOUNTER — TELEPHONE (OUTPATIENT)
Dept: FAMILY MEDICINE CLINIC | Facility: CLINIC | Age: 29
End: 2024-09-12

## 2024-09-12 NOTE — TELEPHONE ENCOUNTER
Patient was scheduled today for an office visit to complete work form.  The work form is for medical marijuana use, to be approved for her employer.  Unable to complete form.  Discussed need to have form completed by medical marijuana provider or someone who is more adverse with use, side effects.  Patient's visit was canceled today.

## 2024-12-18 ENCOUNTER — HOSPITAL ENCOUNTER (EMERGENCY)
Facility: HOSPITAL | Age: 29
Discharge: HOME | End: 2024-12-18
Attending: EMERGENCY MEDICINE
Payer: COMMERCIAL

## 2024-12-18 VITALS
OXYGEN SATURATION: 98 % | HEART RATE: 78 BPM | RESPIRATION RATE: 18 BRPM | TEMPERATURE: 98.7 F | DIASTOLIC BLOOD PRESSURE: 62 MMHG | SYSTOLIC BLOOD PRESSURE: 114 MMHG

## 2024-12-18 DIAGNOSIS — H04.123 DRY EYES: Primary | ICD-10-CM

## 2024-12-18 PROCEDURE — 99284 EMERGENCY DEPT VISIT MOD MDM: CPT

## 2024-12-18 PROCEDURE — 99282 EMERGENCY DEPT VISIT SF MDM: CPT

## 2024-12-18 RX ORDER — ERYTHROMYCIN 5 MG/G
0.5 OINTMENT OPHTHALMIC ONCE
Status: COMPLETED | OUTPATIENT
Start: 2024-12-18 | End: 2024-12-18

## 2024-12-18 RX ORDER — ERYTHROMYCIN 5 MG/G
OINTMENT OPHTHALMIC
Qty: 3.5 G | Refills: 0 | Status: SHIPPED | OUTPATIENT
Start: 2024-12-18 | End: 2024-12-18 | Stop reason: SDUPTHER

## 2024-12-18 RX ORDER — TETRACAINE HYDROCHLORIDE 5 MG/ML
2 SOLUTION OPHTHALMIC ONCE
Status: COMPLETED | OUTPATIENT
Start: 2024-12-18 | End: 2024-12-18

## 2024-12-18 RX ADMIN — FLUORESCEIN SODIUM 1 STRIP: 1 STRIP OPHTHALMIC at 04:31

## 2024-12-18 RX ADMIN — TETRACAINE HYDROCHLORIDE 2 DROP: 5 SOLUTION OPHTHALMIC at 04:31

## 2024-12-18 RX ADMIN — ERYTHROMYCIN 0.5 INCH: 5 OINTMENT OPHTHALMIC at 05:08

## 2024-12-18 NOTE — DISCHARGE INSTRUCTIONS
Follow-up with PCP and ophthalmology.  Return to ED if symptoms worsen, fail to improve, or develop as listed in follow-up section.  Stop contact lense use until seen by ophthalmology.  Erythromycin ointment 4-6 times daily for 7 days .
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
## 2025-01-20 ENCOUNTER — OFFICE VISIT (OUTPATIENT)
Dept: OBGYN CLINIC | Facility: CLINIC | Age: 30
End: 2025-01-20
Payer: COMMERCIAL

## 2025-01-20 VITALS
WEIGHT: 188 LBS | BODY MASS INDEX: 26.92 KG/M2 | HEIGHT: 70 IN | DIASTOLIC BLOOD PRESSURE: 68 MMHG | SYSTOLIC BLOOD PRESSURE: 120 MMHG

## 2025-01-20 DIAGNOSIS — Z30.46 ENCOUNTER FOR NEXPLANON REMOVAL: Primary | ICD-10-CM

## 2025-01-20 PROCEDURE — 11982 REMOVE DRUG IMPLANT DEVICE: CPT | Performed by: PHYSICIAN ASSISTANT

## 2025-01-20 NOTE — PROGRESS NOTES
Universal Protocol:  procedure performed by consultantConsent: Verbal consent obtained. Written consent obtained.  Risks and benefits: risks, benefits and alternatives were discussed  Consent given by: patient  Patient understanding: patient states understanding of the procedure being performed  Patient consent: the patient's understanding of the procedure matches consent given  Procedure consent: procedure consent matches procedure scheduled  Required items: required blood products, implants, devices, and special equipment available  Patient identity confirmed: verbally with patient  Remove and insert drug implant    Date/Time: 1/20/2025 11:30 AM    Performed by: Maureen Chávez PA-C  Authorized by: Maureen Chávez PA-C    Indication:     Indication: Presence of non-biodegradable drug delivery implant      Indication comment:  EXPIRY OF DEVICE, NEED FOR REMOVAL  Pre-procedure:     Prepped with: alcohol 70% and chlorhexidine gluconate      Local anesthetic:  Lidocaine with epinephrine    The site was cleaned and prepped in a sterile fashion: yes    Procedure:     Procedure:  Removal    Small stab incision was made in arm: yes      Left/right:  Left    Visualization of implant was obtained: yes      Site was closed with steri-strips and pressure bandage applied: yes    Comments:      Patient presents to the office for Nexplanon removal due to EXPIRY OF DEVICE. The implant was palpated by patient and provider in her LEFT arm. The area was cleaned with alcohol and numbed with local anesthetic. The area was then prepped in sterile fashion using HIBICLENS A small stab incision was made near the distal end of the implant, and the implant was visualized. The implant was grasped with hemostats and removed intact through the incision. The procedure was uncomplicated and the patient tolerated the procedure well. The incision was closed with steri-strips and a pressure dressing was applied. The patient was counseled to leave  pressure dressing in place for 24 hours and to keep incision covered for the next few days with a band-aid. All questions were answered to patient satisfaction.

## 2025-02-04 ENCOUNTER — OFFICE VISIT (OUTPATIENT)
Dept: URGENT CARE | Facility: CLINIC | Age: 30
End: 2025-02-04
Payer: COMMERCIAL

## 2025-02-04 VITALS
SYSTOLIC BLOOD PRESSURE: 116 MMHG | OXYGEN SATURATION: 99 % | RESPIRATION RATE: 18 BRPM | TEMPERATURE: 98.9 F | HEART RATE: 66 BPM | DIASTOLIC BLOOD PRESSURE: 56 MMHG

## 2025-02-04 DIAGNOSIS — K08.89 PAIN, DENTAL: Primary | ICD-10-CM

## 2025-02-04 PROCEDURE — S9083 URGENT CARE CENTER GLOBAL: HCPCS

## 2025-02-04 PROCEDURE — G0382 LEV 3 HOSP TYPE B ED VISIT: HCPCS

## 2025-02-04 RX ORDER — CHLORHEXIDINE GLUCONATE ORAL RINSE 1.2 MG/ML
15 SOLUTION DENTAL 2 TIMES DAILY
Qty: 120 ML | Refills: 0 | Status: SHIPPED | OUTPATIENT
Start: 2025-02-04
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
## 2025-02-04 NOTE — PATIENT INSTRUCTIONS
Take Augmentin as prescribed. Finish the entire course of medication even if you start feeling better.     You can take ibuprofen/Motrin or acetaminophen/Tylenol as needed for pain.    You should drink liquids/eat food that is room temperature to avoid further irritation.     Recommend follow up in 3-5 days with a dentist.    Go to the ED for fevers, chills, new and worsening symptoms.

## 2025-02-24 ENCOUNTER — APPOINTMENT (OUTPATIENT)
Dept: RADIOLOGY | Facility: CLINIC | Age: 30
End: 2025-02-24
Payer: COMMERCIAL

## 2025-02-24 ENCOUNTER — OFFICE VISIT (OUTPATIENT)
Dept: URGENT CARE | Facility: CLINIC | Age: 30
End: 2025-02-24
Payer: COMMERCIAL

## 2025-02-24 VITALS
HEART RATE: 108 BPM | SYSTOLIC BLOOD PRESSURE: 118 MMHG | DIASTOLIC BLOOD PRESSURE: 59 MMHG | TEMPERATURE: 98.1 F | RESPIRATION RATE: 18 BRPM | OXYGEN SATURATION: 97 %

## 2025-02-24 DIAGNOSIS — M25.511 ACUTE PAIN OF RIGHT SHOULDER: Primary | ICD-10-CM

## 2025-02-24 DIAGNOSIS — M25.511 ACUTE PAIN OF RIGHT SHOULDER: ICD-10-CM

## 2025-02-24 PROCEDURE — 73030 X-RAY EXAM OF SHOULDER: CPT

## 2025-02-24 PROCEDURE — G0382 LEV 3 HOSP TYPE B ED VISIT: HCPCS

## 2025-02-24 PROCEDURE — S9083 URGENT CARE CENTER GLOBAL: HCPCS
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
## 2025-02-24 NOTE — PATIENT INSTRUCTIONS
Rest and elevate shoulder/arm often, apply ice every 3-4 hours for 20 minutes at a time for next 24 hours. Take tylenol/ibuprofen every 4-6 hours as needed for pain. Apply lidocaine patches - 12 hours on, 12 hours off as needed for breakthrough pain. Follow-up with orthopedics within one week. Report to the ER if symptoms worsen.

## 2025-02-25 ENCOUNTER — OFFICE VISIT (OUTPATIENT)
Dept: URGENT CARE | Facility: MEDICAL CENTER | Age: 30
End: 2025-02-25
Payer: COMMERCIAL

## 2025-02-25 VITALS
TEMPERATURE: 98 F | DIASTOLIC BLOOD PRESSURE: 60 MMHG | BODY MASS INDEX: 26.9 KG/M2 | SYSTOLIC BLOOD PRESSURE: 104 MMHG | OXYGEN SATURATION: 99 % | RESPIRATION RATE: 18 BRPM | HEART RATE: 74 BPM | WEIGHT: 187.5 LBS

## 2025-02-25 DIAGNOSIS — R45.89 ANXIETY ABOUT HEALTH: ICD-10-CM

## 2025-02-25 DIAGNOSIS — R59.1 LYMPHADENOPATHY: Primary | ICD-10-CM

## 2025-02-25 LAB — S PYO AG THROAT QL: NEGATIVE

## 2025-02-25 PROCEDURE — G0383 LEV 4 HOSP TYPE B ED VISIT: HCPCS

## 2025-02-25 PROCEDURE — S9083 URGENT CARE CENTER GLOBAL: HCPCS

## 2025-02-25 PROCEDURE — 87880 STREP A ASSAY W/OPTIC: CPT

## 2025-02-25 PROCEDURE — 87070 CULTURE OTHR SPECIMN AEROBIC: CPT

## 2025-02-25 NOTE — LETTER
February 25, 2025     Patient: Rita Packer   YOB: 1995   Date of Visit: 2/25/2025       To Whom it May Concern:    Rita Packer was seen in my clinic on 2/25/2025.     She may return to work on 2/28/2025 or sooner as long as symptoms are improving or resolving and patient is fever free for 24 hours without the use of fever reducing agents .    If you have any questions or concerns, please don't hesitate to call.         Sincerely,          DANIEL Cooper        CC: No Recipients
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
## 2025-02-26 ENCOUNTER — OFFICE VISIT (OUTPATIENT)
Dept: OBGYN CLINIC | Facility: CLINIC | Age: 30
End: 2025-02-26
Payer: COMMERCIAL

## 2025-02-26 ENCOUNTER — TELEPHONE (OUTPATIENT)
Age: 30
End: 2025-02-26

## 2025-02-26 VITALS — HEIGHT: 70 IN | BODY MASS INDEX: 26.77 KG/M2 | WEIGHT: 187 LBS

## 2025-02-26 DIAGNOSIS — M77.8 TENDINITIS OF RIGHT SHOULDER: Primary | ICD-10-CM

## 2025-02-26 DIAGNOSIS — M25.511 ACUTE PAIN OF RIGHT SHOULDER: ICD-10-CM

## 2025-02-26 PROCEDURE — 99244 OFF/OP CNSLTJ NEW/EST MOD 40: CPT | Performed by: ORTHOPAEDIC SURGERY

## 2025-02-26 NOTE — PATIENT INSTRUCTIONS
Patient to follow up with Orthopedics tomorrow morning for R shoulder pain    Differential diagnoses discussed with patient.   Rapid strepA negative  Will send out for culture.   If positive will treat with antibiotics.   If throat culture is negative, please follow up with PCP as discussed for further evaluation and work up including but not limited to blood work.    Referral placed for Psych-Talk Therapy as discussed for anxiety related to health    Follow up with PCP in 3-5 days.  Proceed to  ER if symptoms worsen.    If tests are performed, our office will contact you with results only if changes need to made to the care plan discussed with you at the visit. You can review your full results on [facility].
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
## 2025-02-26 NOTE — TELEPHONE ENCOUNTER
Contacted patient in regards to ASAP Referral  in attempts to verify patient's needs of services and add patient to proper wait list. spoke with patient whom stated she will be getting therapy at her employer and does not need our services at this time. Closing referral.
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
## 2025-02-28 LAB — BACTERIA THROAT CULT: NORMAL

## 2025-03-06 ENCOUNTER — HOSPITAL ENCOUNTER (EMERGENCY)
Facility: HOSPITAL | Age: 30
Discharge: HOME | End: 2025-03-06
Attending: EMERGENCY MEDICINE
Payer: COMMERCIAL

## 2025-03-06 ENCOUNTER — APPOINTMENT (EMERGENCY)
Dept: CT IMAGING | Facility: HOSPITAL | Age: 30
End: 2025-03-06
Payer: COMMERCIAL

## 2025-03-06 VITALS
TEMPERATURE: 98.2 F | RESPIRATION RATE: 17 BRPM | DIASTOLIC BLOOD PRESSURE: 75 MMHG | OXYGEN SATURATION: 100 % | HEART RATE: 84 BPM | SYSTOLIC BLOOD PRESSURE: 113 MMHG

## 2025-03-06 DIAGNOSIS — R59.0 LYMPHADENOPATHY, CERVICAL: Primary | ICD-10-CM

## 2025-03-06 LAB
ALBUMIN SERPL BCG-MCNC: 4.8 G/DL (ref 3.5–5)
ALP SERPL-CCNC: 50 U/L (ref 34–104)
ALT SERPL W P-5'-P-CCNC: 13 U/L (ref 7–52)
ANION GAP SERPL CALCULATED.3IONS-SCNC: 8 MMOL/L (ref 4–13)
AST SERPL W P-5'-P-CCNC: 13 U/L (ref 13–39)
BASOPHILS # BLD AUTO: 0.02 THOUSANDS/ÂΜL (ref 0–0.1)
BASOPHILS NFR BLD AUTO: 0 % (ref 0–1)
BILIRUB SERPL-MCNC: 0.51 MG/DL (ref 0.2–1)
BUN SERPL-MCNC: 11 MG/DL (ref 5–25)
CALCIUM SERPL-MCNC: 9.9 MG/DL (ref 8.4–10.2)
CHLORIDE SERPL-SCNC: 104 MMOL/L (ref 96–108)
CO2 SERPL-SCNC: 28 MMOL/L (ref 21–32)
CREAT SERPL-MCNC: 0.72 MG/DL (ref 0.6–1.3)
EOSINOPHIL # BLD AUTO: 0.04 THOUSAND/ÂΜL (ref 0–0.61)
EOSINOPHIL NFR BLD AUTO: 1 % (ref 0–6)
ERYTHROCYTE [DISTWIDTH] IN BLOOD BY AUTOMATED COUNT: 12.3 % (ref 11.6–15.1)
GFR SERPL CREATININE-BSD FRML MDRD: 115 ML/MIN/1.73SQ M
GLUCOSE SERPL-MCNC: 89 MG/DL (ref 65–140)
HCT VFR BLD AUTO: 37.9 % (ref 34.8–46.1)
HGB BLD-MCNC: 13.3 G/DL (ref 11.5–15.4)
IMM GRANULOCYTES # BLD AUTO: 0.01 THOUSAND/UL (ref 0–0.2)
IMM GRANULOCYTES NFR BLD AUTO: 0 % (ref 0–2)
LYMPHOCYTES # BLD AUTO: 1.63 THOUSANDS/ÂΜL (ref 0.6–4.47)
LYMPHOCYTES NFR BLD AUTO: 32 % (ref 14–44)
MCH RBC QN AUTO: 29.2 PG (ref 26.8–34.3)
MCHC RBC AUTO-ENTMCNC: 35.1 G/DL (ref 31.4–37.4)
MCV RBC AUTO: 83 FL (ref 82–98)
MONOCYTES # BLD AUTO: 0.32 THOUSAND/ÂΜL (ref 0.17–1.22)
MONOCYTES NFR BLD AUTO: 6 % (ref 4–12)
NEUTROPHILS # BLD AUTO: 3.01 THOUSANDS/ÂΜL (ref 1.85–7.62)
NEUTS SEG NFR BLD AUTO: 61 % (ref 43–75)
NRBC BLD AUTO-RTO: 0 /100 WBCS
PLATELET # BLD AUTO: 157 THOUSANDS/UL (ref 149–390)
PMV BLD AUTO: 9.4 FL (ref 8.9–12.7)
POTASSIUM SERPL-SCNC: 4.4 MMOL/L (ref 3.5–5.3)
PROT SERPL-MCNC: 7 G/DL (ref 6.4–8.4)
RBC # BLD AUTO: 4.56 MILLION/UL (ref 3.81–5.12)
SODIUM SERPL-SCNC: 140 MMOL/L (ref 135–147)
TSH SERPL DL<=0.05 MIU/L-ACNC: 1.05 UIU/ML (ref 0.45–4.5)
WBC # BLD AUTO: 5.03 THOUSAND/UL (ref 4.31–10.16)

## 2025-03-06 PROCEDURE — 80053 COMPREHEN METABOLIC PANEL: CPT

## 2025-03-06 PROCEDURE — 99284 EMERGENCY DEPT VISIT MOD MDM: CPT

## 2025-03-06 PROCEDURE — 84443 ASSAY THYROID STIM HORMONE: CPT

## 2025-03-06 PROCEDURE — 70490 CT SOFT TISSUE NECK W/O DYE: CPT

## 2025-03-06 PROCEDURE — 85025 COMPLETE CBC W/AUTO DIFF WBC: CPT

## 2025-03-06 PROCEDURE — 36415 COLL VENOUS BLD VENIPUNCTURE: CPT

## 2025-03-06 PROCEDURE — 99283 EMERGENCY DEPT VISIT LOW MDM: CPT
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
## 2025-03-07 NOTE — DISCHARGE INSTRUCTIONS
Please continue to monitor your symptoms. Encourage you to schedule an appointment with primary care to follow up with your primary care regarding these symptoms. If you develop trouble swallowing or breathing return to the ER.

## 2025-03-11 ENCOUNTER — OFFICE VISIT (OUTPATIENT)
Dept: FAMILY MEDICINE CLINIC | Facility: CLINIC | Age: 30
End: 2025-03-11
Payer: COMMERCIAL

## 2025-03-11 VITALS
BODY MASS INDEX: 27 KG/M2 | WEIGHT: 188.6 LBS | HEIGHT: 70 IN | TEMPERATURE: 98.9 F | OXYGEN SATURATION: 100 % | DIASTOLIC BLOOD PRESSURE: 62 MMHG | SYSTOLIC BLOOD PRESSURE: 110 MMHG | HEART RATE: 99 BPM | RESPIRATION RATE: 20 BRPM

## 2025-03-11 DIAGNOSIS — K11.1 SALIVARY GLAND ENLARGEMENT: Primary | ICD-10-CM

## 2025-03-11 PROCEDURE — 99203 OFFICE O/P NEW LOW 30 MIN: CPT | Performed by: FAMILY MEDICINE

## 2025-03-11 RX ORDER — CEPHALEXIN 500 MG/1
500 CAPSULE ORAL EVERY 8 HOURS SCHEDULED
Qty: 21 CAPSULE | Refills: 0 | Status: SHIPPED | OUTPATIENT
Start: 2025-03-11 | End: 2025-03-18

## 2025-03-11 NOTE — ASSESSMENT & PLAN NOTE
Try bud and salt water gargles    Orders:    cephalexin (KEFLEX) 500 mg capsule; Take 1 capsule (500 mg total) by mouth every 8 (eight) hours for 7 days

## 2025-03-11 NOTE — PROGRESS NOTES
"Name: Rita Packer      : 1995      MRN: [id number]  Encounter Provider: Oseas Mas DO  Encounter Date: 3/11/2025   Encounter department: [facility]  :  Assessment & Plan  Salivary gland enlargement  Try bud and salt water gargles    Orders:    cephalexin (KEFLEX) 500 mg capsule; Take 1 capsule (500 mg total) by mouth every 8 (eight) hours for 7 days           History of Present Illness   Patient presents today for swelling under her neck and chin and review of recent testing done through emergency department.  Also anxiety over the situation      Review of Systems   Constitutional:  Negative for chills, fatigue and fever.   HENT:  Negative for congestion, nosebleeds, rhinorrhea, sinus pressure and sore throat.    Eyes:  Negative for discharge and redness.   Respiratory:  Negative for cough and shortness of breath.    Cardiovascular:  Negative for chest pain, palpitations and leg swelling.   Gastrointestinal:  Negative for abdominal pain, blood in stool and nausea.   Endocrine: Negative for cold intolerance, heat intolerance and polyuria.   Genitourinary:  Negative for dysuria and frequency.   Musculoskeletal:  Negative for arthralgias, back pain and myalgias.   Skin:  Negative for rash.   Neurological:  Negative for dizziness, weakness and headaches.   Hematological:  Negative for adenopathy.   Psychiatric/Behavioral:  Negative for behavioral problems and sleep disturbance. The patient is not nervous/anxious.        Objective   /62 (BP Location: Left arm, Patient Position: Sitting, Cuff Size: Standard)   Pulse 99   Temp 98.9 °F (37.2 °C) (Tympanic)   Resp 20   Ht 5' 10\" (1.778 m)   Wt 85.5 kg (188 lb 9.6 oz)   SpO2 100%   BMI 27.06 kg/m²      Physical Exam  Vitals and nursing note reviewed.   Constitutional:       General: She is not in acute distress.     Appearance: Normal appearance. She is well-developed.   HENT:      Head: Normocephalic and " atraumatic.      Right Ear: Tympanic membrane and external ear normal.      Left Ear: Tympanic membrane and external ear normal.      Nose: Nose normal.      Mouth/Throat:      Mouth: Mucous membranes are moist.      Pharynx: Oropharynx is clear. No oropharyngeal exudate.   Eyes:      General: No scleral icterus.        Right eye: No discharge.         Left eye: No discharge.      Conjunctiva/sclera: Conjunctivae normal.      Pupils: Pupils are equal, round, and reactive to light.   Neck:      Thyroid: No thyromegaly.      Vascular: No JVD.   Cardiovascular:      Rate and Rhythm: Normal rate and regular rhythm.      Heart sounds: Normal heart sounds. No murmur heard.  Pulmonary:      Effort: Pulmonary effort is normal.      Breath sounds: No wheezing or rales.   Chest:      Chest wall: No tenderness.   Abdominal:      General: Bowel sounds are normal. There is no distension.      Palpations: Abdomen is soft. There is no mass.      Tenderness: There is no abdominal tenderness.   Musculoskeletal:         General: No tenderness or deformity. Normal range of motion.      Cervical back: Normal range of motion.   Lymphadenopathy:      Cervical: No cervical adenopathy.   Skin:     General: Skin is warm and dry.      Findings: No rash.   Neurological:      General: No focal deficit present.      Mental Status: She is alert and oriented to person, place, and time.      Cranial Nerves: No cranial nerve deficit.      Coordination: Coordination normal.      Deep Tendon Reflexes: Reflexes are normal and symmetric. Reflexes normal.   Psychiatric:         Mood and Affect: Mood normal.         Behavior: Behavior normal.         Thought Content: Thought content normal.         Judgment: Judgment normal.

## 2025-03-14 ENCOUNTER — TELEPHONE (OUTPATIENT)
Age: 30
End: 2025-03-14

## 2025-03-14 DIAGNOSIS — K11.1 SALIVARY GLAND ENLARGEMENT: Primary | ICD-10-CM

## 2025-03-18 ENCOUNTER — APPOINTMENT (OUTPATIENT)
Dept: LAB | Facility: CLINIC | Age: 30
End: 2025-03-18
Payer: COMMERCIAL

## 2025-03-18 DIAGNOSIS — K11.1 SALIVARY GLAND ENLARGEMENT: ICD-10-CM

## 2025-03-18 LAB
ALBUMIN SERPL BCG-MCNC: 4.6 G/DL (ref 3.5–5)
ALP SERPL-CCNC: 47 U/L (ref 34–104)
ALT SERPL W P-5'-P-CCNC: 12 U/L (ref 7–52)
ANION GAP SERPL CALCULATED.3IONS-SCNC: 10 MMOL/L (ref 4–13)
AST SERPL W P-5'-P-CCNC: 13 U/L (ref 13–39)
BASOPHILS # BLD AUTO: 0.03 THOUSANDS/ÂΜL (ref 0–0.1)
BASOPHILS NFR BLD AUTO: 1 % (ref 0–1)
BILIRUB SERPL-MCNC: 0.46 MG/DL (ref 0.2–1)
BUN SERPL-MCNC: 9 MG/DL (ref 5–25)
CALCIUM SERPL-MCNC: 9.1 MG/DL (ref 8.4–10.2)
CHLORIDE SERPL-SCNC: 106 MMOL/L (ref 96–108)
CO2 SERPL-SCNC: 25 MMOL/L (ref 21–32)
CREAT SERPL-MCNC: 0.67 MG/DL (ref 0.6–1.3)
EOSINOPHIL # BLD AUTO: 0.18 THOUSAND/ÂΜL (ref 0–0.61)
EOSINOPHIL NFR BLD AUTO: 3 % (ref 0–6)
ERYTHROCYTE [DISTWIDTH] IN BLOOD BY AUTOMATED COUNT: 12.8 % (ref 11.6–15.1)
GFR SERPL CREATININE-BSD FRML MDRD: 120 ML/MIN/1.73SQ M
GLUCOSE P FAST SERPL-MCNC: 100 MG/DL (ref 65–99)
HCT VFR BLD AUTO: 41.1 % (ref 34.8–46.1)
HGB BLD-MCNC: 14.1 G/DL (ref 11.5–15.4)
IMM GRANULOCYTES # BLD AUTO: 0.02 THOUSAND/UL (ref 0–0.2)
IMM GRANULOCYTES NFR BLD AUTO: 0 % (ref 0–2)
LYMPHOCYTES # BLD AUTO: 1.79 THOUSANDS/ÂΜL (ref 0.6–4.47)
LYMPHOCYTES NFR BLD AUTO: 33 % (ref 14–44)
MCH RBC QN AUTO: 29.2 PG (ref 26.8–34.3)
MCHC RBC AUTO-ENTMCNC: 34.3 G/DL (ref 31.4–37.4)
MCV RBC AUTO: 85 FL (ref 82–98)
MONOCYTES # BLD AUTO: 0.38 THOUSAND/ÂΜL (ref 0.17–1.22)
MONOCYTES NFR BLD AUTO: 7 % (ref 4–12)
NEUTROPHILS # BLD AUTO: 3.07 THOUSANDS/ÂΜL (ref 1.85–7.62)
NEUTS SEG NFR BLD AUTO: 56 % (ref 43–75)
NRBC BLD AUTO-RTO: 0 /100 WBCS
PLATELET # BLD AUTO: 159 THOUSANDS/UL (ref 149–390)
PMV BLD AUTO: 10.7 FL (ref 8.9–12.7)
POTASSIUM SERPL-SCNC: 4 MMOL/L (ref 3.5–5.3)
PROT SERPL-MCNC: 6.7 G/DL (ref 6.4–8.4)
RBC # BLD AUTO: 4.83 MILLION/UL (ref 3.81–5.12)
SODIUM SERPL-SCNC: 141 MMOL/L (ref 135–147)
TSH SERPL DL<=0.05 MIU/L-ACNC: 2.3 UIU/ML (ref 0.45–4.5)
WBC # BLD AUTO: 5.47 THOUSAND/UL (ref 4.31–10.16)

## 2025-03-18 PROCEDURE — 84443 ASSAY THYROID STIM HORMONE: CPT

## 2025-03-18 PROCEDURE — 36415 COLL VENOUS BLD VENIPUNCTURE: CPT

## 2025-03-18 PROCEDURE — 85025 COMPLETE CBC W/AUTO DIFF WBC: CPT

## 2025-03-18 PROCEDURE — 80053 COMPREHEN METABOLIC PANEL: CPT

## 2025-03-24 NOTE — TELEPHONE ENCOUNTER
Acknowledge    
I tried to call patient but got NALM    
Patient called in with another update. She woke up today with swollen glands and says her neck is very swollen on both sides.  Still has two days of medication left. Also notified patient lab orders are in. Please advise.   
Patient called with update, states she is feeling a lot better. Her glands are not as swollen. She would like to test her Thyroid due to swelling and neuropathy she's been experiencing. She would like to rule out any auto-immune disease. Patient requests call back to advise if that can be ordered. Patient states call back or MyChart message would be okay with her.   
Patient returned call to PCP office. Informed her of PCP recommendations.    Patient had labs done last week. She continues to have pain and swelling in her neck.    Patient requests PCP recommendation.  
Please see message from POD   
Please see message from POD    
Tried to call patient but got NA unable to leave message    
9

## 2025-03-25 DIAGNOSIS — K11.1 SALIVARY GLAND ENLARGEMENT: Primary | ICD-10-CM

## 2025-03-28 ENCOUNTER — OFFICE VISIT (OUTPATIENT)
Dept: FAMILY MEDICINE CLINIC | Facility: CLINIC | Age: 30
End: 2025-03-28
Payer: COMMERCIAL

## 2025-03-28 VITALS
HEIGHT: 70 IN | DIASTOLIC BLOOD PRESSURE: 60 MMHG | WEIGHT: 184.8 LBS | BODY MASS INDEX: 26.45 KG/M2 | OXYGEN SATURATION: 100 % | TEMPERATURE: 97.8 F | HEART RATE: 90 BPM | SYSTOLIC BLOOD PRESSURE: 108 MMHG | RESPIRATION RATE: 18 BRPM

## 2025-03-28 DIAGNOSIS — R21 RASH: ICD-10-CM

## 2025-03-28 DIAGNOSIS — K11.1 SALIVARY GLAND ENLARGEMENT: Primary | ICD-10-CM

## 2025-03-28 PROCEDURE — 99213 OFFICE O/P EST LOW 20 MIN: CPT | Performed by: FAMILY MEDICINE

## 2025-03-28 NOTE — PROGRESS NOTES
Name: Rita Packer      : 1995      MRN: [id number]  Encounter Provider: Oseas Mas DO  Encounter Date: 3/28/2025   Encounter department: [facility]  :  Assessment & Plan  Salivary gland enlargement  Discussed ENT evaluation now as she has intermittent swelling of the glands and at times dry mouth.  She has been rinsing her mouth frequently with salt water gargles and peroxide along with sour candy at times or bud to try and increase salivary secretions.  Her lab work was reviewed as normal and her exam today did not show significant gland enlargement.         Rash  Intermittent dry papular rash back of her hands and neck.  Notes that is worse under stress conditions she will use topical cream and antihistamines as needed if it worsens or changes she will come back.  Today the rash is mostly resolved                History of Present Illness   Patient presents for recurrence of intermittent salivary gland swelling also rash on the back of her hands and her neck that comes and goes      Review of Systems   Constitutional:  Negative for chills, fatigue and fever.   HENT:  Negative for congestion, nosebleeds, rhinorrhea, sinus pressure and sore throat.    Eyes:  Negative for discharge and redness.   Respiratory:  Negative for cough and shortness of breath.    Cardiovascular:  Negative for chest pain, palpitations and leg swelling.   Gastrointestinal:  Negative for abdominal pain, blood in stool and nausea.   Endocrine: Negative for cold intolerance, heat intolerance and polyuria.   Genitourinary:  Negative for dysuria and frequency.   Musculoskeletal:  Negative for arthralgias, back pain and myalgias.   Skin:  Positive for rash.   Neurological:  Negative for dizziness, weakness and headaches.   Hematological:  Negative for adenopathy.   Psychiatric/Behavioral:  Negative for behavioral problems and sleep disturbance. The patient is not nervous/anxious.        Objective  "  /60 (BP Location: Left arm, Patient Position: Sitting, Cuff Size: Standard)   Pulse 90   Temp 97.8 °F (36.6 °C) (Tympanic)   Resp 18   Ht 5' 10\" (1.778 m)   Wt 83.8 kg (184 lb 12.8 oz)   SpO2 100%   BMI 26.52 kg/m²      Physical Exam  Vitals and nursing note reviewed.   Constitutional:       General: She is not in acute distress.     Appearance: Normal appearance. She is well-developed.   HENT:      Head: Normocephalic and atraumatic.      Right Ear: External ear normal.      Left Ear: External ear normal.      Nose: Nose normal.      Mouth/Throat:      Mouth: Mucous membranes are moist.      Pharynx: Oropharynx is clear. No oropharyngeal exudate.   Eyes:      General: No scleral icterus.        Right eye: No discharge.         Left eye: No discharge.      Conjunctiva/sclera: Conjunctivae normal.      Pupils: Pupils are equal, round, and reactive to light.   Neck:      Thyroid: No thyromegaly.      Vascular: No JVD.   Cardiovascular:      Rate and Rhythm: Normal rate and regular rhythm.      Heart sounds: Normal heart sounds. No murmur heard.  Pulmonary:      Effort: Pulmonary effort is normal.      Breath sounds: No wheezing or rales.   Chest:      Chest wall: No tenderness.   Abdominal:      General: Bowel sounds are normal. There is no distension.      Palpations: Abdomen is soft. There is no mass.      Tenderness: There is no abdominal tenderness.   Musculoskeletal:         General: No tenderness or deformity. Normal range of motion.      Cervical back: Normal range of motion.   Lymphadenopathy:      Cervical: No cervical adenopathy.   Skin:     General: Skin is warm and dry.      Findings: Rash present.   Neurological:      General: No focal deficit present.      Mental Status: She is alert and oriented to person, place, and time.      Cranial Nerves: No cranial nerve deficit.      Coordination: Coordination normal.      Deep Tendon Reflexes: Reflexes are normal and symmetric. Reflexes normal. "   Psychiatric:         Mood and Affect: Mood normal.         Behavior: Behavior normal.         Thought Content: Thought content normal.         Judgment: Judgment normal.

## 2025-03-28 NOTE — ASSESSMENT & PLAN NOTE
Intermittent dry papular rash back of her hands and neck.  Notes that is worse under stress conditions she will use topical cream and antihistamines as needed if it worsens or changes she will come back.  Today the rash is mostly resolved

## 2025-03-28 NOTE — ASSESSMENT & PLAN NOTE
Discussed ENT evaluation now as she has intermittent swelling of the glands and at times dry mouth.  She has been rinsing her mouth frequently with salt water gargles and peroxide along with sour candy at times or bud to try and increase salivary secretions.  Her lab work was reviewed as normal and her exam today did not show significant gland enlargement.

## 2025-04-10 ENCOUNTER — LAB REQUISITION (OUTPATIENT)
Dept: LAB | Facility: HOSPITAL | Age: 30
End: 2025-04-10
Payer: COMMERCIAL

## 2025-04-10 DIAGNOSIS — K11.1 HYPERTROPHY OF SALIVARY GLAND: ICD-10-CM

## 2025-04-10 PROCEDURE — 88341 IMHCHEM/IMCYTCHM EA ADD ANTB: CPT | Performed by: PATHOLOGY

## 2025-04-10 PROCEDURE — 88305 TISSUE EXAM BY PATHOLOGIST: CPT | Performed by: PATHOLOGY

## 2025-04-10 PROCEDURE — 88342 IMHCHEM/IMCYTCHM 1ST ANTB: CPT | Performed by: PATHOLOGY

## 2025-04-13 ENCOUNTER — OFFICE VISIT (OUTPATIENT)
Dept: URGENT CARE | Facility: CLINIC | Age: 30
End: 2025-04-13
Payer: COMMERCIAL

## 2025-04-13 VITALS
BODY MASS INDEX: 26.48 KG/M2 | WEIGHT: 185 LBS | SYSTOLIC BLOOD PRESSURE: 105 MMHG | OXYGEN SATURATION: 99 % | RESPIRATION RATE: 18 BRPM | DIASTOLIC BLOOD PRESSURE: 59 MMHG | HEIGHT: 70 IN | TEMPERATURE: 98 F | HEART RATE: 73 BPM

## 2025-04-13 DIAGNOSIS — R22.0 LIP SWELLING: Primary | ICD-10-CM

## 2025-04-13 DIAGNOSIS — K08.89 PAIN, DENTAL: ICD-10-CM

## 2025-04-13 PROCEDURE — S9083 URGENT CARE CENTER GLOBAL: HCPCS | Performed by: PHYSICAL MEDICINE & REHABILITATION

## 2025-04-13 PROCEDURE — G0382 LEV 3 HOSP TYPE B ED VISIT: HCPCS | Performed by: PHYSICAL MEDICINE & REHABILITATION

## 2025-04-13 RX ORDER — CLINDAMYCIN HYDROCHLORIDE 300 MG/1
300 CAPSULE ORAL 4 TIMES DAILY
Qty: 28 CAPSULE | Refills: 0 | Status: SHIPPED | OUTPATIENT
Start: 2025-04-13 | End: 2025-04-20
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
## 2025-04-15 PROCEDURE — 88305 TISSUE EXAM BY PATHOLOGIST: CPT | Performed by: PATHOLOGY

## 2025-04-16 NOTE — PROGRESS NOTES
Name: Rita Packer      : 1995      MRN: [id number]  Encounter Provider: Shima De Oliveira DO  Encounter Date: 2025   Encounter department: [facility] [address]  :  Assessment & Plan  Salivary gland enlargement  Patient is a 29-year-old female presenting for further evaluation of solid.  Gland enlargement.  Patient started noticing enlarged lymph nodes after a tooth infection and then had persistent submandibular gland enlargement.  For many years patient has been dealing with sicca symptoms with dry eyes and dry mouth along with vaginal dryness.  Has had recurrent oral cavities with multiple dental procedures.  Patient underwent a minor salivary gland biopsy with a focus score of 0 making Sjogren's syndrome less likely.  However has not had serologic testing and will complete workup.  Patient does report symptoms that seem consistent with Raynaud's phenomenon.  Other etiologies can be infectious, especially given that symptoms started after a tooth infection.  Can also keep sarcoidosis and IgG4 related disease on the differential however patient does not have any other signs or symptoms of this, however will see if biopsy results can be assessed for these findings.  -Discussed conservative measures to help with sicca symptoms  -Ophthalmology referral placed for dry eyes and eye pressure  -Complete serologic and infectious screening workup as below  -Will discuss with pathology biopsy can be stain for IgG4 related disease  Orders:    Ambulatory Referral to Rheumatology    HIV 1/2 AB/AG w Reflex [facility] for 2 yr old and above; Future    Hepatitis B core antibody, total; Future    Hepatitis B surface antibody; Future    Hepatitis B surface antigen; Future    Hepatitis C antibody; Future    YOANDY by IFA Reflex for Rheumatologist; Future    Sjogren's Antibodies; Future    Ambulatory Referral to Ophthalmology; Future    IgG, IgA, IgM; Future    C3 complement; Future    C4 complement;  Future    Xerostomia    Orders:    Ambulatory Referral to Rheumatology    Eye dryness    Orders:    Ambulatory Referral to Rheumatology    Ambulatory Referral to Ophthalmology; Future    RTC in 2-3 months       Pertinent Medical History   Reviewed         History of Present Illness   Rita Packer is a 29 y.o. female who presents for further evaluation for salivary gland enlargement and Xerostomia.      HPI   Patient states that 2 months ago she started with a tooth infection.  Reports she had to get multiple dental procedures.  Following this, she started noticing swollen lymph nodes in her neck.  Patient was seen by ENT and went through another course of antibiotics.  Given persistent swelling in the submandibular glands, patient underwent lip biopsy to rule out Sjogren's syndrome which was negative.  Patient states that for the past few months she has been feeling exhausted.    Reports that she has always had dental issues.  Reports recurrent cavities growing up.  States she always has dry eyes and dry mouth.  Reports she feels a pressure in her eyes and uses over-the-counter eyedrops.  Patient states that she has trouble swallowing dry foods such as bread and crackers without water.  Reports that she initially attributed it to marijuana use, however the symptoms started prior to this.  Patient also endorses vaginal dryness.    Patient reports when she is stressed she gets bumps on her hands and by her neck which come and go.  Reports that it is mildly pruritic.    Denies any cough or worsening shortness of breath.  No fevers.  Reports that she has always gotten recurrent infections.    Since she was young, reports her fingers turn white in the cold.  Reports it is painful when that happens.  No fissures, pits or blisters.  States that she randomly gets numbness and tingling in her hands and feet.    Reports pain in her shoulders, knees and hips.  Reports the pain gets worse with activity.  States that  "stretching does help.  Does wake up stiff in the morning, however some days the pain last throughout the day.  No swelling appreciated.    Family history: No known autoimmune diseases    Review of Systems  Complete ROS conducted as per HPI. In addition, denies:  Fever  Photosensitive rash  Recurrent oral ulcers  Muscle weakness  Uveitis  Dactylitis  Chest pain  SOB  Pleurisy  Gross hematuria  Foamy urine  Joint issues other than noted above    Current Outpatient Medications on File Prior to Visit   Medication Sig Dispense Refill    clindamycin (CLEOCIN) 300 MG capsule Take 1 capsule (300 mg total) by mouth 4 (four) times a day for 7 days 28 capsule 0     No current facility-administered medications on file prior to visit.      Social History     Tobacco Use    Smoking status: Former     Current packs/day: 0.00     Average packs/day: 1 pack/day for 15.0 years (15.0 ttl pk-yrs)     Types: Cigarettes     Start date: 2009     Quit date: 8/10/2023     Years since quittin.6    Smokeless tobacco: Never   Vaping Use    Vaping status: Every Day    Substances: Nicotine, Flavoring   Substance and Sexual Activity    Alcohol use: Not Currently     Comment: occ    Drug use: Yes     Types: Marijuana     Comment: medical card    Sexual activity: Yes     Partners: Male     Birth control/protection: None         Objective   /60 (BP Location: Right arm, Patient Position: Sitting, Cuff Size: Standard)   Pulse 70   Temp 97.5 °F (36.4 °C) (Tympanic)   Ht 5' 10\" (1.778 m)   Wt 85 kg (187 lb 6.4 oz)   SpO2 100%   BMI 26.89 kg/m²     Physical Exam  General appearance: normal appearing, no acute distress  Skin: normal, no rashes  HEENT: normal, moist oropharynx, no nasal or oral ulcers  Lymph nodes: no palpable adenopathy  Lungs: normal respiratory effort, comfortable on room air, lungs clear to auscultation b/l   Heart: normal heart sounds, normal rate, normal rhythm,  Abdomen: soft, normal bowel sounds, no " tenderness  Neurologic: no obvious neurological deficits   Extremities: no edema, warm and well perfused     Musculoskeletal Exam:   - Observation: no obvious joint abnormalities    - Palpation: no joint tenderness  - Synovitis: absent  - Joint effusions: absent  - ROM: intact throughout  - Muscle Strength: 5/5 throughout     Recent labs:  Lab Results   Component Value Date/Time    SODIUM 141 03/18/2025 11:08 AM    K 4.0 03/18/2025 11:08 AM    BUN 9 03/18/2025 11:08 AM    CREATININE 0.67 03/18/2025 11:08 AM    GLUC 89 03/06/2025 07:36 PM    CALCIUM 9.1 03/18/2025 11:08 AM    AST 13 03/18/2025 11:08 AM    ALT 12 03/18/2025 11:08 AM    ALB 4.6 03/18/2025 11:08 AM    TP 6.7 03/18/2025 11:08 AM    EGFR 120 03/18/2025 11:08 AM     Lab Results   Component Value Date/Time    HGB 14.1 03/18/2025 11:08 AM    WBC 5.47 03/18/2025 11:08 AM     03/18/2025 11:08 AM     Lab Results   Component Value Date/Time    IVH6UJWVMHFO 2.302 03/18/2025 11:08 AM     Salivary gland biopsy 4/10/25  A.  Minor salivary gland (biopsy):     - Seven (7) minor salivary gland lobules present measuring approximately 6.5 mm2; adequate for evaluation.      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     - Focus score 0; not supportive of Sjogren syndrome.    I have personally reviewed notes, labs, and imaging available in the chart.     Shima De Oliveira DO, CCD, [facility]

## 2025-04-17 ENCOUNTER — CONSULT (OUTPATIENT)
Age: 30
End: 2025-04-17
Payer: COMMERCIAL

## 2025-04-17 VITALS
TEMPERATURE: 97.5 F | WEIGHT: 187.4 LBS | DIASTOLIC BLOOD PRESSURE: 60 MMHG | SYSTOLIC BLOOD PRESSURE: 116 MMHG | HEART RATE: 70 BPM | HEIGHT: 70 IN | OXYGEN SATURATION: 100 % | BODY MASS INDEX: 26.83 KG/M2

## 2025-04-17 DIAGNOSIS — K11.7 XEROSTOMIA: ICD-10-CM

## 2025-04-17 DIAGNOSIS — H04.129 EYE DRYNESS: ICD-10-CM

## 2025-04-17 DIAGNOSIS — K11.1 SALIVARY GLAND ENLARGEMENT: ICD-10-CM

## 2025-04-17 PROCEDURE — 99244 OFF/OP CNSLTJ NEW/EST MOD 40: CPT | Performed by: STUDENT IN AN ORGANIZED HEALTH CARE EDUCATION/TRAINING PROGRAM

## 2025-04-17 NOTE — ASSESSMENT & PLAN NOTE
Patient is a 29-year-old female presenting for further evaluation of solid.  Gland enlargement.  Patient started noticing enlarged lymph nodes after a tooth infection and then had persistent submandibular gland enlargement.  For many years patient has been dealing with sicca symptoms with dry eyes and dry mouth along with vaginal dryness.  Has had recurrent oral cavities with multiple dental procedures.  Patient underwent a minor salivary gland biopsy with a focus score of 0 making Sjogren's syndrome less likely.  However has not had serologic testing and will complete workup.  Patient does report symptoms that seem consistent with Raynaud's phenomenon.  Other etiologies can be infectious, especially given that symptoms started after a tooth infection.  Can also keep sarcoidosis and IgG4 related disease on the differential however patient does not have any other signs or symptoms of this, however will see if biopsy results can be assessed for these findings.  -Discussed conservative measures to help with sicca symptoms  -Ophthalmology referral placed for dry eyes and eye pressure  -Complete serologic and infectious screening workup as below  -Will discuss with pathology biopsy can be stain for IgG4 related disease  Orders:    Ambulatory Referral to Rheumatology    [facility] for 2 yr old and above; Future    Hepatitis B core antibody, total; Future    Hepatitis B surface antibody; Future    Hepatitis B surface antigen; Future    Hepatitis C antibody; Future    YOANDY by IFA Reflex for Rheumatologist; Future    Sjogren's Antibodies; Future    Ambulatory Referral to Ophthalmology; Future    IgG, IgA, IgM; Future    C3 complement; Future    C4 complement; Future

## 2025-04-17 NOTE — PATIENT INSTRUCTIONS
For treatment of dry eyes:  - Use preservative-free eye drops throughout the day (Refresh, Systane)  - Eye lubricants/ointments can be helpful to apply before sleep   - Talk to your eye doctor about prescription drops such as Restasis   - Keep a humidified environment  - Glasses/goggles can be useful to trap in moisture around the eye, especially if going outside in a dry or windy environment     For treatment of dry mouth:   - Use sugar-free (NOT sugarless) xylitol-containing gum or lozenges. Avoid sugar-free options that contain aspartame as they can be drying.   - Use oral mouth rinse designed for dry mouth (Biotene, Act)  - Use Dr. Bartlett's Herbal Lollipops or Loloz, especially those that contain licorice root extract (helps prevent cavities) UNLESS licorice interacts with your other medications  - XyliMelts are beneficial for nighttime moisture (place a disc on the gum above the upper molars)  - Massage the salivary glands  - Drink green tea  - Do oil pulling with coconut oil (take 1/2 teaspoon of solid virgin coconut oil, place in mouth, swish around between teeth for about 90 seconds)   - Massage the gums with a soft toothbrush (Curaprox and Plumasoft are good examples) or Waterpik without water or toothpaste to stimulate salivary flow   - Stay hydrated, but avoid drinking excessive water (water does not lubricate the mouth and saliva is swallowed every time you swallow water). Ice chips are a good alternative

## 2025-07-02 ENCOUNTER — NURSE TRIAGE (OUTPATIENT)
Age: 30
End: 2025-07-02

## 2025-07-02 ENCOUNTER — APPOINTMENT (OUTPATIENT)
Dept: LAB | Facility: CLINIC | Age: 30
End: 2025-07-02
Payer: COMMERCIAL

## 2025-07-02 ENCOUNTER — OFFICE VISIT (OUTPATIENT)
Dept: OBGYN CLINIC | Facility: CLINIC | Age: 30
End: 2025-07-02
Payer: COMMERCIAL

## 2025-07-02 VITALS
BODY MASS INDEX: 27.63 KG/M2 | WEIGHT: 193 LBS | DIASTOLIC BLOOD PRESSURE: 60 MMHG | HEIGHT: 70 IN | SYSTOLIC BLOOD PRESSURE: 98 MMHG

## 2025-07-02 DIAGNOSIS — B37.31 YEAST VAGINITIS: Primary | ICD-10-CM

## 2025-07-02 DIAGNOSIS — N89.8 VAGINAL ITCHING: ICD-10-CM

## 2025-07-02 DIAGNOSIS — K11.1 SALIVARY GLAND ENLARGEMENT: ICD-10-CM

## 2025-07-02 DIAGNOSIS — N89.8 VAGINAL DISCHARGE: ICD-10-CM

## 2025-07-02 LAB
BV WHIFF TEST VAG QL: NEGATIVE
C3 SERPL-MCNC: 117 MG/DL (ref 87–200)
C4 SERPL-MCNC: 26 MG/DL (ref 19–52)
CLUE CELLS SPEC QL WET PREP: NEGATIVE
IGA SERPL-MCNC: 129 MG/DL (ref 66–433)
IGG SERPL-MCNC: 959 MG/DL (ref 635–1741)
IGM SERPL-MCNC: 96 MG/DL (ref 45–281)
PH SMN: 4 [PH]
T VAGINALIS VAG QL WET PREP: NEGATIVE
YEAST VAG QL WET PREP: POSITIVE

## 2025-07-02 PROCEDURE — 86235 NUCLEAR ANTIGEN ANTIBODY: CPT

## 2025-07-02 PROCEDURE — 86038 ANTINUCLEAR ANTIBODIES: CPT

## 2025-07-02 PROCEDURE — 87389 HIV-1 AG W/HIV-1&-2 AB AG IA: CPT

## 2025-07-02 PROCEDURE — 86803 HEPATITIS C AB TEST: CPT

## 2025-07-02 PROCEDURE — 36415 COLL VENOUS BLD VENIPUNCTURE: CPT

## 2025-07-02 PROCEDURE — 86704 HEP B CORE ANTIBODY TOTAL: CPT

## 2025-07-02 PROCEDURE — 99213 OFFICE O/P EST LOW 20 MIN: CPT | Performed by: OBSTETRICS & GYNECOLOGY

## 2025-07-02 PROCEDURE — 86706 HEP B SURFACE ANTIBODY: CPT

## 2025-07-02 PROCEDURE — 87340 HEPATITIS B SURFACE AG IA: CPT

## 2025-07-02 PROCEDURE — 82784 ASSAY IGA/IGD/IGG/IGM EACH: CPT

## 2025-07-02 PROCEDURE — 87210 SMEAR WET MOUNT SALINE/INK: CPT | Performed by: OBSTETRICS & GYNECOLOGY

## 2025-07-02 PROCEDURE — 86160 COMPLEMENT ANTIGEN: CPT

## 2025-07-02 RX ORDER — FLUCONAZOLE 150 MG/1
150 TABLET ORAL
Qty: 3 TABLET | Refills: 0 | Status: SHIPPED | OUTPATIENT
Start: 2025-07-02 | End: 2025-07-09

## 2025-07-02 NOTE — PROGRESS NOTES
"Name: Rita Packer      : 1995      MRN: [id number]  Encounter Provider: Isha Guillen MD  Encounter Date: 2025   Encounter department: [facility]  :  Assessment & Plan  Yeast vaginitis  Will start diflucan   Also discussed not sitting in wet bathing suits or clothes for long periods of time.   If no improvement in sx will call   Orders:    fluconazole (DIFLUCAN) 150 mg tablet; Take 1 tablet (150 mg total) by mouth every 3 (three) days for 3 doses    Vaginal discharge    Orders:    POCT wet mount    Vaginal itching    Orders:    POCT wet mount        History of Present Illness   Rita Packer is a 29 y.o. female who presents for problem visit.   Had lymphadenopathy back in February. Was given course of amoxicillin and then keflex for multiple days.   Since that time, has noticed thick white discharge and vaginal itching.   Has done several rounds of monistat which will help but then comes back.   Denies any odor, abnormal bleeding, fever or chills.   Has normal regular cycles.   Only uses water with cleaning.     Review of Systems   Constitutional:  Negative for fever.   Gastrointestinal:  Negative for abdominal pain.   Genitourinary:  Positive for vaginal discharge. Negative for dysuria, pelvic pain, vaginal bleeding and vaginal pain.          Objective   BP 98/60   Ht 5' 10\" (1.778 m)   Wt 87.5 kg (193 lb)   LMP 2025 (Approximate)   BMI 27.69 kg/m²      Physical Exam  Constitutional:       General: She is not in acute distress.     Appearance: Normal appearance. She is not ill-appearing, toxic-appearing or diaphoretic.   HENT:      Head: Normocephalic and atraumatic.     Cardiovascular:      Pulses: Normal pulses.   Pulmonary:      Effort: Pulmonary effort is normal. No respiratory distress.   Abdominal:      Palpations: Abdomen is soft.      Tenderness: There is no abdominal tenderness. There is no guarding or rebound.   Genitourinary:     General: Normal " vulva.      Labia:         Right: No rash, tenderness, lesion or injury.         Left: No rash, tenderness, lesion or injury.       Vagina: No signs of injury and foreign body. Vaginal discharge present. No erythema, tenderness, bleeding, lesions or prolapsed vaginal walls.      Cervix: Friability present. No cervical motion tenderness, discharge, lesion, erythema, cervical bleeding or eversion.      Uterus: Normal. Not deviated, not enlarged, not fixed, not tender and no uterine prolapse.       Adnexa:         Right: No mass, tenderness or fullness.          Left: No mass, tenderness or fullness.        Comments: Thick white clumpy discharge on exam   Erythema in the vaginal canal   Wet mount positive for copious yeast     Musculoskeletal:         General: Normal range of motion.      Cervical back: Normal range of motion.     Skin:     General: Skin is warm and dry.     Neurological:      General: No focal deficit present.      Mental Status: She is alert. Mental status is at baseline.     Psychiatric:         Mood and Affect: Mood normal.         Behavior: Behavior normal.         Thought Content: Thought content normal.         Judgment: Judgment normal.            76

## 2025-07-02 NOTE — TELEPHONE ENCOUNTER
Regarding: yeast infection  ----- Message from Mi JHAVERI sent at 7/2/2025  8:55 AM EDT -----  Recurring yeast infection.  Patient with discomfort;itchiness; and discharge [place] patient seen 1/25

## 2025-07-02 NOTE — TELEPHONE ENCOUNTER
"REASON FOR CONVERSATION: Vaginitis and Vaginal Discharge    SYMPTOMS: Vaginal discharge for past 2 days - white chunky, yellow-tinged. Vaginal itching, discomfort, swelling, redness.     OTHER HEALTH INFORMATION: Patient states she was on month long antibiotics in February and has been having recurring vaginal infections since. States she has treated self several times with OTC monistat. Symptoms improve, but then return. This is the 4th vaginal infection within past several month. Patient states she tried monistat again this time, but it did not improve symptoms. Patient concerned this may not be yeast infection due to new onset yellow colored discharged. States low chance for STD exposure, same partner for past 2 years. Denies fevers, chills, or abdominal pain.   Spoke with office clerical, appointment scheduled today for evaluation.    PROTOCOL DISPOSITION: See Within 3 Days in Office    CARE ADVICE PROVIDED: Advised she should be seen for visit to evaluated vaginitis.     PRACTICE FOLLOW-UP: None      Reason for Disposition   Abnormal color vaginal discharge (i.e., yellow, green, gray)    Answer Assessment - Initial Assessment Questions  1. DISCHARGE: \"Describe the discharge.\" (e.g., white, yellow, green, gray, foamy, cottage cheese-like)      White and chunky, this time slight yellowish tint  2. ODOR: \"Is there a bad odor?\"      Denies  3. ONSET: \"When did the discharge begin?\"      2 days ago  4. RASH: \"Is there a rash in the genital area?\" If Yes, ask: \"Describe it.\" (e.g., redness, blisters, sores, bumps)      Some redness, feels swollen and uncomfortable.   5. ABDOMEN PAIN: \"Are you having any abdomen pain?\" If Yes, ask: \"What does it feel like? \" (e.g., crampy, dull, intermittent, constant)       Denies  6. ABDOMEN PAIN SEVERITY: If present, ask: \"How bad is it?\" (e.g., Scale 1-10; mild, moderate, or severe)      Denies  7. CAUSE: \"What do you think is causing the discharge?\" \"Have you had the same problem " "before?\" \"What happened then?\"      Patient think yeast infection, but now unsure due to slight yellow tinted discharge  8. OTHER SYMPTOMS: \"Do you have any other symptoms?\" (e.g., fever, itching, vaginal bleeding, pain with urination, injury to genital area, vaginal foreign body)      Vaginal itching. Denies abnormal bleeding, painful urination, fevers or chills.   9. PREGNANCY: \"Is there any chance you are pregnant?\" \"When was your last menstrual period?\"      LMP 2-3 weeks ago    Protocols used: Vaginal Discharge-Adult-[place]    "

## 2025-07-03 LAB
HBV CORE AB SER QL: NORMAL
HBV SURFACE AB SER-ACNC: >500 MIU/ML
HBV SURFACE AG SER QL: NORMAL
HCV AB SER QL: NORMAL
HIV 1+2 AB+HIV1 P24 AG SERPL QL IA: NORMAL

## 2025-07-06 LAB — ANA SER QL IF: NEGATIVE

## 2025-07-13 LAB
ENA SS-A AB SER IA-ACNC: <0.5 U/ML (ref ?–10)
ENA SS-B IGG SER IA-ACNC: <0.6 U/ML (ref ?–10)